# Patient Record
Sex: FEMALE | Race: WHITE | Employment: FULL TIME | URBAN - METROPOLITAN AREA
[De-identification: names, ages, dates, MRNs, and addresses within clinical notes are randomized per-mention and may not be internally consistent; named-entity substitution may affect disease eponyms.]

---

## 2017-01-03 ENCOUNTER — TRANSFERRED RECORDS (OUTPATIENT)
Dept: HEALTH INFORMATION MANAGEMENT | Facility: CLINIC | Age: 37
End: 2017-01-03

## 2017-01-06 ENCOUNTER — TRANSFERRED RECORDS (OUTPATIENT)
Dept: HEALTH INFORMATION MANAGEMENT | Facility: CLINIC | Age: 37
End: 2017-01-06

## 2017-01-08 ENCOUNTER — TRANSFERRED RECORDS (OUTPATIENT)
Dept: HEALTH INFORMATION MANAGEMENT | Facility: CLINIC | Age: 37
End: 2017-01-08

## 2017-01-25 ENCOUNTER — PRENATAL OFFICE VISIT (OUTPATIENT)
Dept: OBGYN | Facility: CLINIC | Age: 37
End: 2017-01-25
Payer: COMMERCIAL

## 2017-01-25 VITALS
HEART RATE: 96 BPM | BODY MASS INDEX: 31.45 KG/M2 | SYSTOLIC BLOOD PRESSURE: 141 MMHG | TEMPERATURE: 98.4 F | WEIGHT: 166.6 LBS | HEIGHT: 61 IN | DIASTOLIC BLOOD PRESSURE: 96 MMHG

## 2017-01-25 DIAGNOSIS — Z30.011 ENCOUNTER FOR INITIAL PRESCRIPTION OF CONTRACEPTIVE PILLS: ICD-10-CM

## 2017-01-25 PROCEDURE — 99207 ZZC POST PARTUM EXAM: CPT | Performed by: NURSE PRACTITIONER

## 2017-01-25 RX ORDER — NORGESTIMATE AND ETHINYL ESTRADIOL 0.25-0.035
1 KIT ORAL DAILY
Qty: 84 TABLET | Refills: 3 | Status: SHIPPED | OUTPATIENT
Start: 2017-01-25 | End: 2017-07-10

## 2017-01-25 NOTE — NURSING NOTE
"Chief Complaint   Patient presents with     Post Partum Exam       Initial /96 mmHg  Pulse 96  Temp(Src) 98.4  F (36.9  C) (Oral)  Ht 5' 1.25\" (1.556 m)  Wt 166 lb 9.6 oz (75.569 kg)  BMI 31.21 kg/m2  LMP 06/24/2016  Breastfeeding? No Estimated body mass index is 31.21 kg/(m^2) as calculated from the following:    Height as of this encounter: 5' 1.25\" (1.556 m).    Weight as of this encounter: 166 lb 9.6 oz (75.569 kg)..  BP completed using cuff size: fransisco Husain CMA        "

## 2017-01-25 NOTE — MR AVS SNAPSHOT
"              After Visit Summary   2017    Ledy Oconnor    MRN: [id number]           Patient Information     Date Of Birth          1980        Visit Information        Provider Department      2017 11:10 AM Ruby Valdez APRN CNP [facility]        Today's Diagnoses     Routine postpartum follow-up    -  1     Postpartum depression         Encounter for initial prescription of contraceptive pills            Follow-ups after your visit        Who to contact     If you have questions or need follow up information about today's clinic visit or your schedule please contact [facility] directly at [phone].  Normal or non-critical lab and imaging results will be communicated to you by [employer], letter or phone within 4 business days after the clinic has received the results. If you do not hear from us within 7 days, please contact the clinic through [employer] or phone. If you have a critical or abnormal lab result, we will notify you by phone as soon as possible.  Submit refill requests through [employer] or call your pharmacy and they will forward the refill request to us. Please allow 3 business days for your refill to be completed.          Additional Information About Your Visit        MyChart Information     [employer] lets you send messages to your doctor, view your test results, renew your prescriptions, schedule appointments and more. To sign up, go to [email or web address] . Click on \"Log in\" on the left side of the screen, which will take you to the Welcome page. Then click on \"Sign up Now\" on the right side of the page.     You will be asked to enter the access code listed below, as well as some personal information. Please follow the directions to create your username and password.     Your access code is: [email or web address]  Expires: 3/9/2017  2:18 PM     Your access code will  in 90 days. If you need help or a new code, please call your [facility] or " "[phone].        Care EveryWhere ID     This is your Care EveryWhere ID. This could be used by other organizations to access your [place] medical records  [id number]        Your Vitals Were     Pulse Temperature Height BMI (Body Mass Index) Last Period Breastfeeding?    96 98.4  F (36.9  C) (Oral) 5' 1.25\" (1.556 m) 31.21 kg/m2 06/24/2016 No       Blood Pressure from Last 3 Encounters:   01/25/17 141/96   12/29/16 130/79   12/09/16 112/69    Weight from Last 3 Encounters:   01/25/17 166 lb 9.6 oz (75.569 kg)   12/29/16 179 lb (81.194 kg)   12/09/16 179 lb 3.2 oz (81.285 kg)              Today, you had the following     No orders found for display         Today's Medication Changes          These changes are accurate as of: 1/25/17 11:59 AM.  If you have any questions, ask your nurse or doctor.               Start taking these medicines.        Dose/Directions    norgestimate-ethinyl estradiol 0.25-35 MG-MCG per tablet   Commonly known as:  ORTHO-CYCLEN, SPRINTEC   Used for:  Encounter for initial prescription of contraceptive pills   Started by:  Ruby Valdez APRN CNP        Dose:  1 tablet   Take 1 tablet by mouth daily   Quantity:  84 tablet   Refills:  3       sertraline 50 MG tablet   Commonly known as:  ZOLOFT   Used for:  Postpartum depression   Started by:  Ruby Valdez APRN CNP        Take 1/2 tablet (25 mg) for 1-2 weeks, then increase to 1 tablet orally daily   Quantity:  30 tablet   Refills:  1         Stop taking these medicines if you haven't already. Please contact your care team if you have questions.     ferrous sulfate 325 (65 FE) MG tablet   Commonly known as:  IRON   Stopped by:  Ruby Valdez APRN CNP           PRENATAL VITAMIN PO   Stopped by:  Ruby Valdez APRN CNP                Where to get your medicines      These medications were sent to [id number] - [facility], [address] AT [facility] [address] [place] [address] [facility] [address], " [place] [address]     Phone:  [phone]    - norgestimate-ethinyl estradiol 0.25-35 MG-MCG per tablet  - sertraline 50 MG tablet             Primary Care Provider Office Phone # Fax #    Tyron Anderson [phone]       [facility] [address]  [facility] [address]        Thank you!     Thank you for choosing [facility]  [facility] your [facility]. Our goal is always to provide you with excellent care. Hearing back from our patients is one way we can continue to improve our services. Please take a few minutes to complete the written survey that you may receive in the mail after your visit with us. Thank you!             Your Updated Medication List - Protect others around you: Learn how to safely use, store and throw away your medicines at www.disposemymeds.org.          This list is accurate as of: 1/25/17 11:59 AM.  Always use your most recent med list.                   Brand Name Dispense Instructions for use    norgestimate-ethinyl estradiol 0.25-35 MG-MCG per tablet    ORTHO-CYCLEN, SPRINTEC    84 tablet    Take 1 tablet by mouth daily       sertraline 50 MG tablet    ZOLOFT    30 tablet    Take 1/2 tablet (25 mg) for 1-2 weeks, then increase to 1 tablet orally daily

## 2017-01-25 NOTE — PROGRESS NOTES
Ledy is here for a postpartum checkup.  Pregnancy was: Complicated by a marginal placental abruption. Patient was hospitalized after a bleeding episode and while in the hospital, had a large bleed and went into  labor. Delivered at 28 weeks. Daughter is in the NICU and will likely be there the next 2 months.     She is struggling with how to feel about everything. Feels she has great days and very poor days. Emotions and moods are all over. 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  She is not sure if she is needing medication or not at this time. Feels her symptoms are not consistent and can very from day to day.     Obstetric History       T0      TAB0   SAB2   E0   M0   L1       # Outcome Date GA Lbr Lonny/2nd Weight Sex Delivery Anes PTL Lv   3  17 28w2d  2 lb 6.8 oz (1.1 kg) F    Y   2 2009           1 2007                 Since delivery, she has not been breast feeding.  She has not had a normal menses.  She has not had intercourse.  Patient screened for postpartum depression and complaints are positive feelings of depression, loss of interest in activities, poor sleep, concentration, poor appetite. Patient decided not to return to her work and has since quit. Some days she knows she needs to take a break for herself, but then feels guilty if she does not go visit her daughter.  Screening has also been completed for intimate partner violence. She would like to discuss possible treatment of her depression and contraception.    O: This is a well appearing female in no acute distress. Answers questions and maintains eye contact appropriately. Vital signs noted.  RESPIRATORY: Clear to auscultation bilaterally.  CV: Regular rate and rhythm without murmur, gallop, rub  ABDOMEN: Soft, nontender, nondistended, normoactive bowel sounds. No hepatosplenomegaly. No guarding, rebounding, or  rigidity.  Vulva: No external lesions, normal hair distribution, no adenopathy  BUS:  Normal, no masses noted  Vagina: Moist, pink, no abnormal discharge, well rugated, no lesions  Cervix: Pink, parous, midline. Without cervical motion tenderness.  Uterus: Normal size and shape, non-tender, mobile  Ovaries: No masses, non-tender, mobile    A/P  Routine Postpartum     - I discussed the new pap recommendations regarding screening.  Explained the rationale for increased intervals between paps.  Questions asked and answered.  She does agree to this regiment.   - Pap was not performed   - Contraception: They are certain they do not want any future pregnancy.  will be doing vasectomy, wants something short term until then. Has used Depo Provera for a year 20 years ago and no other contraception. Discussed options for contraception with patient and she would like to try an oral contraceptive pill. She is not 3 weeks out from delivery at this time, so advised when to start the pill and rationale, taking it at the same time every day, possible side effects she may experience.    - Depression. We had a lengthy discussion regarding her concerns, fears, feelings. PHQ-9 reviewed. Patient is reassured that a lot of what she is feeling is normal, especially given her pregnancy and delivery. A lot of support is given. We discussed her situation at length and ways to help focus on other things as well as her daughter. She is really hesitant to go on medication, but interested in trying it. We discussed management and for now, she requests a prescription to have if she chooses to start. We discussed taking it, increasing dose, possible side effects, how long before she may feel benefits. She is encouraged to continue the support groups and finding outlets to allow herself to relax. If she chooses to start medication, will let me know so we can follow up accordingly. Patient encouraged to call or return to clinic if she needs  anything.    Ruby MARVIN CNP

## 2017-01-26 ASSESSMENT — PATIENT HEALTH QUESTIONNAIRE - PHQ9: SUM OF ALL RESPONSES TO PHQ QUESTIONS 1-9: 12

## 2017-03-21 ENCOUNTER — OFFICE VISIT (OUTPATIENT)
Dept: FAMILY MEDICINE | Facility: CLINIC | Age: 37
End: 2017-03-21
Payer: COMMERCIAL

## 2017-03-21 VITALS
HEART RATE: 84 BPM | SYSTOLIC BLOOD PRESSURE: 134 MMHG | DIASTOLIC BLOOD PRESSURE: 88 MMHG | TEMPERATURE: 98 F | WEIGHT: 170 LBS | OXYGEN SATURATION: 99 % | BODY MASS INDEX: 31.86 KG/M2

## 2017-03-21 DIAGNOSIS — L70.0 CYSTIC ACNE: ICD-10-CM

## 2017-03-21 DIAGNOSIS — D22.39 MELANOCYTIC NEVUS OF FACE, OTHER LOCATION: Primary | ICD-10-CM

## 2017-03-21 DIAGNOSIS — Z23 NEED FOR VACCINATION: ICD-10-CM

## 2017-03-21 PROCEDURE — 99213 OFFICE O/P EST LOW 20 MIN: CPT | Mod: 25 | Performed by: PHYSICIAN ASSISTANT

## 2017-03-21 PROCEDURE — 90715 TDAP VACCINE 7 YRS/> IM: CPT | Performed by: PHYSICIAN ASSISTANT

## 2017-03-21 PROCEDURE — 90471 IMMUNIZATION ADMIN: CPT | Performed by: PHYSICIAN ASSISTANT

## 2017-03-21 NOTE — MR AVS SNAPSHOT
After Visit Summary   3/21/2017    Ledy Oconnor    MRN: [id number]           Patient Information     Date Of Birth          1980        Visit Information        Provider Department      3/21/2017 9:50 AM Leonora Garcia PA-C [facility]        Today's Diagnoses     Melanocytic nevus of face, other location    -  1    Cystic acne          Care Instructions    Follow up with dermatology          Follow-ups after your visit        Additional Services     DERMATOLOGY REFERRAL       Your provider has referred you to: FMG: [facility] [place] [phone]   [email or web address]  UMP: [facility] [place] [phone]   [email or web address]  [facility] [place] [phone]   [email or web address]  [place] (2 locations) [phone]   [email or web address]  [place] [phone]   [email or web address]    Please be aware that coverage of these services is subject to the terms and limitations of your health insurance plan.  Call member services at your health plan with any benefit or coverage questions.      Please bring the following with you to your appointment:    (1) Any X-Rays, CTs or MRIs which have been performed.  Contact the facility where they were done to arrange for  prior to your scheduled appointment.    (2) List of current medications  (3) This referral request   (4) Any documents/labs given to you for this referral                  Who to contact     If you have questions or need follow up information about today's clinic visit or your schedule please contact [facility] directly at [phone].  Normal or non-critical lab and imaging results will be communicated to you by MyChart, letter or phone within 4 business days after the clinic has received the  "results. If you do not hear from us within 7 days, please contact the clinic through [employer] or phone. If you have a critical or abnormal lab result, we will notify you by phone as soon as possible.  Submit refill requests through [employer] or call your pharmacy and they will forward the refill request to us. Please allow 3 business days for your refill to be completed.          Additional Information About Your Visit        [employer] Information     [employer] lets you send messages to your doctor, view your test results, renew your prescriptions, schedule appointments and more. To sign up, go to [email or web address] . Click on \"Log in\" on the left side of the screen, which will take you to the Welcome page. Then click on \"Sign up Now\" on the right side of the page.     You will be asked to enter the access code listed below, as well as some personal information. Please follow the directions to create your username and password.     Your access code is: [email or web address]  Expires: 2017 10:26 AM     Your access code will  in 90 days. If you need help or a new code, please call your [place] clinic or [phone].        Care EveryWhere ID     This is your Care EveryWhere ID. This could be used by other organizations to access your [place] medical records  [id number]        Your Vitals Were     Pulse Temperature Pulse Oximetry BMI (Body Mass Index)          84 98  F (36.7  C) (Oral) 99% 31.86 kg/m2         Blood Pressure from Last 3 Encounters:   17 134/88   17 (!) 141/96   16 130/79    Weight from Last 3 Encounters:   17 170 lb (77.1 kg)   17 166 lb 9.6 oz (75.6 kg)   16 179 lb (81.2 kg)              We Performed the Following     DERMATOLOGY REFERRAL          Today's Medication Changes          These changes are accurate as of: 3/21/17 10:26 AM.  If you have any questions, ask your nurse or doctor.               Stop taking these medicines if you haven't already. Please " contact your care team if you have questions.     sertraline 50 MG tablet   Commonly known as:  ZOLOFT   Stopped by:  Leonora Garcia PA-C                    Primary Care Provider Office Phone # Fax #    Tyron Anderson [phone]       [facility] [address]  [facility] [address]        Thank you!     Thank you for choosing [facility]  [facility]. Our goal is always to provide you with excellent care. Hearing back from our patients is one way we can continue to improve our services. Please take a few minutes to complete the written survey that you may receive in the mail after your visit with us. Thank you!             Your Updated Medication List - Protect others around you: Learn how to safely use, store and throw away your medicines at www.disposemymeds.org.          This list is accurate as of: 3/21/17 10:26 AM.  Always use your most recent med list.                   Brand Name Dispense Instructions for use    norgestimate-ethinyl estradiol 0.25-35 MG-MCG per tablet    ORTHO-CYCLEN, SPRINTEC    84 tablet    Take 1 tablet by mouth daily

## 2017-03-21 NOTE — PROGRESS NOTES
SUBJECTIVE:                                                    Ledy Oconnor is a 36 year old female who presents to clinic today for the following health issues:    Mole of left side of cheek - present all her life. Cyst growing under her mole for past year. It is starting to become tender. She also has cystic acne, which worsened with her pregnancy. She denies any erythema, drainage, swelling, fevers, and chills. She has tried mutliple over the counter treatments with no improvement. She denies any other concerns.     Needs tdap - she will get this today.        Problem list and histories reviewed & adjusted, as indicated.  Additional history: as documented    Patient Active Problem List   Diagnosis     Cluster headache, chronic     Fibroid uterus     Past Surgical History   Procedure Laterality Date     No history of surgery         Social History   Substance Use Topics     Smoking status: Former Smoker     Packs/day: 0.00     Types: Cigarettes     Smokeless tobacco: Never Used     Alcohol use No     Family History   Problem Relation Age of Onset     Migraines Mother      unknown age     Migraines Brother 30         Current Outpatient Prescriptions   Medication Sig Dispense Refill     norgestimate-ethinyl estradiol (ORTHO-CYCLEN, SPRINTEC) 0.25-35 MG-MCG per tablet Take 1 tablet by mouth daily (Patient not taking: Reported on 3/21/2017) 84 tablet 3     No Known Allergies  BP Readings from Last 3 Encounters:   03/21/17 (!) 153/103   01/25/17 (!) 141/96   12/29/16 130/79    Wt Readings from Last 3 Encounters:   03/21/17 170 lb (77.1 kg)   01/25/17 166 lb 9.6 oz (75.6 kg)   12/29/16 179 lb (81.2 kg)                    Reviewed and updated as needed this visit by clinical staff       Reviewed and updated as needed this visit by Provider         ROS:  Constitutional, and integumentary systems are negative, except as otherwise noted.    OBJECTIVE:                                                    /88  Pulse  84  Temp 98  F (36.7  C) (Oral)  Wt 170 lb (77.1 kg)  SpO2 99%  BMI 31.86 kg/m2  Body mass index is 31.86 kg/(m^2).  GENERAL: healthy, alert and no distress  SKIN: Left Cheek - small mole present with a firm cyst present underneath the mole, slightly mobile, tender, no erythema, no drainage, no surrounding swelling; cystic acne and scars present on the neck          ASSESSMENT/PLAN:                                                        ICD-10-CM    1. Melanocytic nevus of face, other location D22.39 DERMATOLOGY REFERRAL   2. Cystic acne L70.0 DERMATOLOGY REFERRAL   3. Need for vaccination Z23 TDAP (ADACEL AGES 11-64) [40414.002]     1st  Administration  [34542]       Patient Instructions   Follow up with dermatology        Leonora Garcia PA-C  [facility]

## 2017-03-21 NOTE — NURSING NOTE
"Chief Complaint   Patient presents with     Mole     left side of cheek - right thigh - referral to derm       Initial BP (!) 153/103  Pulse 84  Temp 98  F (36.7  C) (Oral)  Wt 170 lb (77.1 kg)  SpO2 99%  BMI 31.86 kg/m2 Estimated body mass index is 31.86 kg/(m^2) as calculated from the following:    Height as of 1/25/17: 5' 1.25\" (1.556 m).    Weight as of this encounter: 170 lb (77.1 kg).  Medication Reconciliation: complete  Kaitlynn Correia M.A.    "

## 2017-07-10 ENCOUNTER — OFFICE VISIT (OUTPATIENT)
Dept: FAMILY MEDICINE | Facility: CLINIC | Age: 37
End: 2017-07-10
Payer: COMMERCIAL

## 2017-07-10 VITALS
HEART RATE: 96 BPM | BODY MASS INDEX: 32.2 KG/M2 | SYSTOLIC BLOOD PRESSURE: 131 MMHG | TEMPERATURE: 99.3 F | DIASTOLIC BLOOD PRESSURE: 82 MMHG | WEIGHT: 175 LBS | OXYGEN SATURATION: 96 % | HEIGHT: 62 IN

## 2017-07-10 DIAGNOSIS — M65.4 DE QUERVAIN'S DISEASE (TENOSYNOVITIS): Primary | ICD-10-CM

## 2017-07-10 PROCEDURE — 99214 OFFICE O/P EST MOD 30 MIN: CPT | Performed by: PHYSICIAN ASSISTANT

## 2017-07-10 RX ORDER — IBUPROFEN 800 MG/1
800 TABLET, FILM COATED ORAL EVERY 8 HOURS PRN
Qty: 90 TABLET | Refills: 1 | Status: SHIPPED | OUTPATIENT
Start: 2017-07-10 | End: 2019-03-25

## 2017-07-10 NOTE — PROGRESS NOTES
SUBJECTIVE:                                                    Ledy Oconnor is a 36 year old female who presents to clinic today for the following health issues:      NEW PATIENT TO ME:      Joint Pain    Onset: x 1 month and a half    Description:   Location: left wrist  Character: Sharp, Dull ache, Stabbing and Burning    Intensity: moderate    Progression of Symptoms: same    Accompanying Signs & Symptoms:  Other symptoms: tingling     History:   Previous similar pain: no       Precipitating factors:   Trauma or overuse: no     Alleviating factors:  Improved by: rest/inactivity, heat, ice, immobilization, massage and support wrap that does not include the thumb.     Therapies Tried and outcome: Noted above    Left wrist and thumb pain x 1.5 months. Worsening. Worse with use.   Is left handed.  Has a young new infant that she lifts a lot, otherwise nothing new. No history of joint pains. Does text a lot.   Worked before in the [employer] field and used her hands and thumb but never had issues before.  No known injury.     Does have some weakness with her  and can drop things.  It really bothers her. No redness or swelling. No fevers.  Tingling goes away quickly but can occur in thumb.   ibuprofen helps some, she is wondering about an Rx for this.   Nonsmoker.     Problem list and histories reviewed & adjusted, as indicated.  Additional history: as documented    Patient Active Problem List   Diagnosis     Cluster headache, chronic     Fibroid uterus     Past Surgical History:   Procedure Laterality Date     NO HISTORY OF SURGERY         Social History   Substance Use Topics     Smoking status: Former Smoker     Packs/day: 0.00     Types: Cigarettes     Smokeless tobacco: Never Used     Alcohol use No     Family History   Problem Relation Age of Onset     Migraines Mother      unknown age     Migraines Brother 30         Current Outpatient Prescriptions   Medication Sig Dispense Refill     ibuprofen  "(ADVIL/MOTRIN) 800 MG tablet Take 1 tablet (800 mg) by mouth every 8 hours as needed for moderate pain With food. 90 tablet 1     No Known Allergies    ROS:  Constitutional, HEENT, cardiovascular, pulmonary, gi and gu systems are negative, except as otherwise noted.    OBJECTIVE:     /82  Pulse 96  Temp 99.3  F (37.4  C) (Oral)  Ht 5' 2\" (1.575 m)  Wt 175 lb (79.4 kg)  SpO2 96%  Breastfeeding? No  BMI 32.01 kg/m2  Body mass index is 32.01 kg/(m^2).  GENERAL: healthy, alert and no distress  RESP: lungs clear to auscultation - no rales, rhonchi or wheezes  CV: regular rate and rhythm, normal S1 S2, no S3 or S4, no murmur, click or rub, no peripheral edema and peripheral pulses strong  MS: left de quervans region-palpable crepitus and pain.  Pain with movement of thumb especially abduction/adduction. No erythema or edema. No rashes. Wrist range of motion intact. Finger range of motion intact. Cap refill normal. Distal sensation intact. Radial pulse normal. No elbow deformity.  Some mild tingling with tinels test of carpel tunnel also. POSITIVE FINKELSTEINS.   SKIN: no suspicious lesions or rashes  NEURO: Normal strength and tone, mentation intact and speech normal  PSYCH: mentation appears normal, affect normal/bright        ASSESSMENT/PLAN:   ASSESSMENT / PLAN:  (M65.4) De Quervain's disease (tenosynovitis)  (primary encounter diagnosis)  Comment:   Plan: ORTHOPEDICS ADULT REFERRAL, ibuprofen         (ADVIL/MOTRIN) 800 MG tablet          We discussed handout below and will start with conservative treatment  To ortho if not improving as expected  See patient instructions below for plan.  Correct brace given in clinic to restrict thumb movement    Billin min spent face-to-face with patient. 15 min on history, 5 on exam, 5 on discussing diagnosis and treatment plan.     Patient Instructions   Schedule with ortho only if worsening or not improving after 2-3 weeks  Limit use of thumb as much as " possible  Wear brace as much as possible  Ibuprofen as needed          De Quervain Tenosynovitis    De Quervain tenosynovitis is inflammation of tendons and synovium on the thumb side of the wrist. Tendons are fibers that attach muscle to bone. Synovium is a slick membrane that helps tendons move. Movements done over and over can irritate and inflame these tissues. This can cause pain when you touch or grasp objects, turn or twist your wrist, or make a fist. You may also have pain and swelling near the base of the thumb or numbness along the back of your thumb and index finger. You may also feel the thumb catch or snap when you move it.  Treatment will depend on how bad the pain is. It can often be treated with medicines, injections, splinting, and home care. If your case is severe, you may be referred to a specialist to talk about surgery.  Home care  Your healthcare provider may prescribe medicines to relieve pain and reduce inflammation. A steroid medicine may be injected near the tendons. This reduces swelling. The healthcare provider may also suggest taking over-the-counter medicines like ibuprofen or naproxen. These help reduce inflammation. Take all medicines only as directed.  The following are general care guidelines:    Avoid repetitive movements of your wrist and thumb.    Note any activity that causes pain or swelling. If possible, avoid or limit that activity.    Put a cold pack on your thumb. You can make your own cold pack by wrapping a plastic bag of ice or bag of frozen vegetables in a thin towel. Hold this to your thumb for up to 20 minutes at a time. Don't put ice directly on the skin.    Your healthcare provider may put a splint on the thumb to hold it still. Use the splint as you have been instructed. In some cases, you may need to use a splint 24 hours a day for 4 to 6 weeks. This will allow the wrist and thumb to heal.  Follow-up care  Follow up with your healthcare provider, or as advised.  You may need more treatment if your injury is severe or if your symptoms don't get better. This additional treatment may include local injections, physical therapy, and surgery.  When to seek medical advice  Call your healthcare provider right away if any of these occur:    Increase in pain or swelling    If you have fever, chills, redness, warmth, or drainage    Symptoms get worse after taking medicine    Pain spreads farther down the thumb or into the forearm    Pain continues to get in the way of daily life  Date Last Reviewed: 1/18/2016 2000-2017 [employer]. [address] [place] [id number]. All rights reserved. This information is not intended as a substitute for professional medical care. Always follow your healthcare professional's instructions.            Cathryn Mcgregor PA-C  [facility]

## 2017-07-10 NOTE — PATIENT INSTRUCTIONS
Schedule with ortho only if worsening or not improving after 2-3 weeks  Limit use of thumb as much as possible  Wear brace as much as possible  Ibuprofen as needed          De Quervain Tenosynovitis    De Quervain tenosynovitis is inflammation of tendons and synovium on the thumb side of the wrist. Tendons are fibers that attach muscle to bone. Synovium is a slick membrane that helps tendons move. Movements done over and over can irritate and inflame these tissues. This can cause pain when you touch or grasp objects, turn or twist your wrist, or make a fist. You may also have pain and swelling near the base of the thumb or numbness along the back of your thumb and index finger. You may also feel the thumb catch or snap when you move it.  Treatment will depend on how bad the pain is. It can often be treated with medicines, injections, splinting, and home care. If your case is severe, you may be referred to a specialist to talk about surgery.  Home care  Your healthcare provider may prescribe medicines to relieve pain and reduce inflammation. A steroid medicine may be injected near the tendons. This reduces swelling. The healthcare provider may also suggest taking over-the-counter medicines like ibuprofen or naproxen. These help reduce inflammation. Take all medicines only as directed.  The following are general care guidelines:    Avoid repetitive movements of your wrist and thumb.    Note any activity that causes pain or swelling. If possible, avoid or limit that activity.    Put a cold pack on your thumb. You can make your own cold pack by wrapping a plastic bag of ice or bag of frozen vegetables in a thin towel. Hold this to your thumb for up to 20 minutes at a time. Don't put ice directly on the skin.    Your healthcare provider may put a splint on the thumb to hold it still. Use the splint as you have been instructed. In some cases, you may need to use a splint 24 hours a day for 4 to 6 weeks. This will allow  the wrist and thumb to heal.  Follow-up care  Follow up with your healthcare provider, or as advised. You may need more treatment if your injury is severe or if your symptoms don't get better. This additional treatment may include local injections, physical therapy, and surgery.  When to seek medical advice  Call your healthcare provider right away if any of these occur:    Increase in pain or swelling    If you have fever, chills, redness, warmth, or drainage    Symptoms get worse after taking medicine    Pain spreads farther down the thumb or into the forearm    Pain continues to get in the way of daily life  Date Last Reviewed: 1/18/2016 2000-2017 [employer]. [address] [place] [address], [place] [address]. All rights reserved. This information is not intended as a substitute for professional medical care. Always follow your healthcare professional's instructions.

## 2017-07-10 NOTE — NURSING NOTE
"Chief Complaint   Patient presents with     Musculoskeletal Problem     wrist pain per pt x 1 month and a half no known injury        Initial /82  Pulse 96  Temp 99.3  F (37.4  C) (Oral)  Ht 5' 2\" (1.575 m)  Wt 175 lb (79.4 kg)  SpO2 96%  Breastfeeding? No  BMI 32.01 kg/m2 Estimated body mass index is 32.01 kg/(m^2) as calculated from the following:    Height as of this encounter: 5' 2\" (1.575 m).    Weight as of this encounter: 175 lb (79.4 kg).  Medication Reconciliation: complete      Ty Crawford MA    "

## 2017-07-10 NOTE — MR AVS SNAPSHOT
After Visit Summary   7/10/2017    Ledy Oconnor    MRN: [id number]           Patient Information     Date Of Birth          1980        Visit Information        Provider Department      7/10/2017 11:00 AM Cathryn Mcgregor PA-C [facility]        Today's Diagnoses     De Quervain's disease (tenosynovitis)    -  1      Care Instructions    Schedule with ortho only if worsening or not improving after 2-3 weeks  Limit use of thumb as much as possible  Wear brace as much as possible  Ibuprofen as needed          De Quervain Tenosynovitis    De Quervain tenosynovitis is inflammation of tendons and synovium on the thumb side of the wrist. Tendons are fibers that attach muscle to bone. Synovium is a slick membrane that helps tendons move. Movements done over and over can irritate and inflame these tissues. This can cause pain when you touch or grasp objects, turn or twist your wrist, or make a fist. You may also have pain and swelling near the base of the thumb or numbness along the back of your thumb and index finger. You may also feel the thumb catch or snap when you move it.  Treatment will depend on how bad the pain is. It can often be treated with medicines, injections, splinting, and home care. If your case is severe, you may be referred to a specialist to talk about surgery.  Home care  Your healthcare provider may prescribe medicines to relieve pain and reduce inflammation. A steroid medicine may be injected near the tendons. This reduces swelling. The healthcare provider may also suggest taking over-the-counter medicines like ibuprofen or naproxen. These help reduce inflammation. Take all medicines only as directed.  The following are general care guidelines:    Avoid repetitive movements of your wrist and thumb.    Note any activity that causes pain or swelling. If possible, avoid or limit that activity.    Put a cold pack on your thumb. You can make your own cold pack by  wrapping a plastic bag of ice or bag of frozen vegetables in a thin towel. Hold this to your thumb for up to 20 minutes at a time. Don't put ice directly on the skin.    Your healthcare provider may put a splint on the thumb to hold it still. Use the splint as you have been instructed. In some cases, you may need to use a splint 24 hours a day for 4 to 6 weeks. This will allow the wrist and thumb to heal.  Follow-up care  Follow up with your healthcare provider, or as advised. You may need more treatment if your injury is severe or if your symptoms don't get better. This additional treatment may include local injections, physical therapy, and surgery.  When to seek medical advice  Call your healthcare provider right away if any of these occur:    Increase in pain or swelling    If you have fever, chills, redness, warmth, or drainage    Symptoms get worse after taking medicine    Pain spreads farther down the thumb or into the forearm    Pain continues to get in the way of daily life  Date Last Reviewed: 1/18/2016 2000-2017 [employer]. [address] [place] [address]. All rights reserved. This information is not intended as a substitute for professional medical care. Always follow your healthcare professional's instructions.                Follow-ups after your visit        Additional Services     ORTHOPEDICS ADULT REFERRAL       Your provider has referred you to: [facility]: [facility] [phone]   [email or web address]    Please be aware that coverage of these services is subject to the terms and limitations of your health insurance plan.  Call member services at your health plan with any benefit or coverage questions.      Please bring the following to your appointment:    >>   Any x-rays, CTs or MRIs which have been performed.  Contact the facility where they were done to arrange for  prior to your scheduled appointment.    >>   List of  "current medications   >>   This referral request   >>   Any documents/labs given to you for this referral                  Who to contact     If you have questions or need follow up information about today's clinic visit or your schedule please contact [facility] directly at [phone].  Normal or non-critical lab and imaging results will be communicated to you by MyChart, letter or phone within 4 business days after the clinic has received the results. If you do not hear from us within 7 days, please contact the clinic through MyChart or phone. If you have a critical or abnormal lab result, we will notify you by phone as soon as possible.  Submit refill requests through [employer] or call your pharmacy and they will forward the refill request to us. Please allow 3 business days for your refill to be completed.          Additional Information About Your Visit        MyChart Information     [employer] lets you send messages to your doctor, view your test results, renew your prescriptions, schedule appointments and more. To sign up, go to [email or web address] . Click on \"Log in\" on the left side of the screen, which will take you to the Welcome page. Then click on \"Sign up Now\" on the right side of the page.     You will be asked to enter the access code listed below, as well as some personal information. Please follow the directions to create your username and password.     Your access code is: [email or web address]  Expires: 10/8/2017 11:23 AM     Your access code will  in 90 days. If you need help or a new code, please call your [facility] or [phone].        Care EveryWhere ID     This is your Care EveryWhere ID. This could be used by other organizations to access your [place] medical records  [id number]        Your Vitals Were     Pulse Temperature Height Pulse Oximetry Breastfeeding? BMI (Body Mass Index)    96 99.3  F (37.4  C) (Oral) 5' 2\" (1.575 m) 96% No 32.01 kg/m2       Blood Pressure " from Last 3 Encounters:   07/10/17 131/82   03/21/17 134/88   01/25/17 (!) 141/96    Weight from Last 3 Encounters:   07/10/17 175 lb (79.4 kg)   03/21/17 170 lb (77.1 kg)   01/25/17 166 lb 9.6 oz (75.6 kg)              We Performed the Following     ORTHOPEDICS ADULT REFERRAL          Today's Medication Changes          These changes are accurate as of: 7/10/17 11:25 AM.  If you have any questions, ask your nurse or doctor.               Start taking these medicines.        Dose/Directions    ibuprofen 800 MG tablet   Commonly known as:  ADVIL/MOTRIN   Used for:  De Quervain's disease (tenosynovitis)   Started by:  Cathryn Mcgregor PA-C        Dose:  800 mg   Take 1 tablet (800 mg) by mouth every 8 hours as needed for moderate pain With food.   Quantity:  90 tablet   Refills:  1            Where to get your medicines      These medications were sent to [facility] [address] [place] [address] AT [address] [address]     Phone:  [phone]     ibuprofen 800 MG tablet                Primary Care Provider Office Phone # Fax #    Tyron Anderson [phone]       [facility] [address] [address] [facility] [address]        Equal Access to Services     ASHISH BERRY AH: Hadii mac ku hadasho Soomaali, waaxda luqadaha, qaybta kaalmada adeegyada, adilson garcia. So [facility] [phone].    ATENCIÓN: Si habla español, tiene a mckee disposición servicios gratuitos de asistencia lingüística. Llame al [phone].    We comply with applicable federal civil rights laws and [place] laws. We do not discriminate on the basis of race, color, national origin, age, disability sex, sexual orientation or gender identity.            Thank you!     Thank you for choosing [facility]  for your [facility]. Our goal is always to provide you with excellent care. Hearing back from our patients is one way we  can continue to improve our services. Please take a few minutes to complete the written survey that you may receive in the mail after your visit with us. Thank you!             Your Updated Medication List - Protect others around you: Learn how to safely use, store and throw away your medicines at www.disposemymeds.org.          This list is accurate as of: 7/10/17 11:25 AM.  Always use your most recent med list.                   Brand Name Dispense Instructions for use Diagnosis    ibuprofen 800 MG tablet    ADVIL/MOTRIN    90 tablet    Take 1 tablet (800 mg) by mouth every 8 hours as needed for moderate pain With food.    De Quervain's disease (tenosynovitis)

## 2017-08-16 ENCOUNTER — OFFICE VISIT (OUTPATIENT)
Dept: FAMILY MEDICINE | Facility: CLINIC | Age: 37
End: 2017-08-16
Payer: COMMERCIAL

## 2017-08-16 VITALS
BODY MASS INDEX: 31.24 KG/M2 | WEIGHT: 170.8 LBS | HEART RATE: 97 BPM | SYSTOLIC BLOOD PRESSURE: 127 MMHG | DIASTOLIC BLOOD PRESSURE: 87 MMHG | TEMPERATURE: 98 F

## 2017-08-16 DIAGNOSIS — N83.299 COMPLEX OVARIAN CYST: ICD-10-CM

## 2017-08-16 DIAGNOSIS — R10.31 RLQ ABDOMINAL PAIN: Primary | ICD-10-CM

## 2017-08-16 PROCEDURE — 99214 OFFICE O/P EST MOD 30 MIN: CPT | Performed by: FAMILY MEDICINE

## 2017-08-16 NOTE — NURSING NOTE
"Chief Complaint   Patient presents with     Abdominal Pain     was seen in urgent care 8/9/17, having abdominal pressure, had baby in January at 28 weeks gestation        Initial /87  Pulse 97  Temp 98  F (36.7  C) (Oral)  Wt 170 lb 12.8 oz (77.5 kg)  LMP 07/25/2017  BMI 31.24 kg/m2 Estimated body mass index is 31.24 kg/(m^2) as calculated from the following:    Height as of 7/10/17: 5' 2\" (1.575 m).    Weight as of this encounter: 170 lb 12.8 oz (77.5 kg).  Medication Reconciliation: complete  Richy Sanchez CMA    "

## 2017-08-16 NOTE — PROGRESS NOTES
SUBJECTIVE:  37 year old.The patient has a history of right sided pelvic pain.  This started one week ago. Location right lower quadrant  quality sharp Associated symptoms are pressure like constipation.  She was seen in the [facility] urgent care.  They felt this was a pelvic cyst. ROS no constipation or diarrhea. Cycles are regular,  Period are heavy. The intense pain she has took 5 days before it improved which ibuprofen helped.  The pain now has a pressure on the right lower quadrant  Reviewed health maintenance  Patient Active Problem List   Diagnosis     Cluster headache, chronic     Fibroid uterus     Past Medical History:   Diagnosis Date     Headache        OBJECTIVE:  no apparent distress  /87  Pulse 97  Temp 98  F (36.7  C) (Oral)  Wt 170 lb 12.8 oz (77.5 kg)  LMP 07/25/2017  BMI 31.24 kg/m2    LUNGS:  CTA B/L, no wheezing or crackles.   Cardiovascular: negative, PMI normal. No lifts, heaves, or thrills. RRR. No murmurs, clicks gallops or rub   Gastrointestinal: Abdomen soft,-tender. Tender right lower quadrant with no rebound. BS normal. No masses, organomegaly       ICD-10-CM    1. RLQ abdominal pain R10.31    2. Complex ovarian cyst N83.299 US Pelvic Complete w Transvaginal    PLAN: Retun to clinic if symptoms worsen

## 2017-08-16 NOTE — MR AVS SNAPSHOT
"              After Visit Summary   8/16/2017    Ledy Oconnor    MRN: [id number]           Patient Information     Date Of Birth          1980        Visit Information        Provider Department      8/16/2017 4:00 PM Syed Brown MD [facility]        Today's Diagnoses     RLQ abdominal pain    -  1    Complex ovarian cyst           Follow-ups after your visit        Future tests that were ordered for you today     Open Future Orders        Priority Expected Expires Ordered    US Pelvic Complete w Transvaginal Routine  8/16/2018 8/16/2017            Who to contact     If you have questions or need follow up information about today's clinic visit or your schedule please contact [facility] directly at [phone].  Normal or non-critical lab and imaging results will be communicated to you by MyChart, letter or phone within 4 business days after the clinic has received the results. If you do not hear from us within 7 days, please contact the clinic through Crambuhart or phone. If you have a critical or abnormal lab result, we will notify you by phone as soon as possible.  Submit refill requests through ShareHows or call your pharmacy and they will forward the refill request to us. Please allow 3 business days for your refill to be completed.          Additional Information About Your Visit        MyChart Information     ShareHows lets you send messages to your doctor, view your test results, renew your prescriptions, schedule appointments and more. To sign up, go to [email or web address] . Click on \"Log in\" on the left side of the screen, which will take you to the Welcome page. Then click on \"Sign up Now\" on the right side of the page.     You will be asked to enter the access code listed below, as well as some personal information. Please follow the directions to create your username and password.     Your access code is: [email or web address]  Expires: 10/8/2017 11:23 AM     Your " access code will  in 90 days. If you need help or a new code, please call your [place] clinic or [phone].        Care EveryWhere ID     This is your Care EveryWhere ID. This could be used by other organizations to access your [facility] medical records  [id number]        Your Vitals Were     Pulse Temperature Last Period BMI (Body Mass Index)          97 98  F (36.7  C) (Oral) 2017 31.24 kg/m2         Blood Pressure from Last 3 Encounters:   17 127/87   07/10/17 131/82   17 134/88    Weight from Last 3 Encounters:   17 170 lb 12.8 oz (77.5 kg)   07/10/17 175 lb (79.4 kg)   17 170 lb (77.1 kg)               Primary Care Provider Office Phone # Fax #    Tyron Anderson [phone]       [address]  [facility] [address]        Equal Access to Services     [facility] : Hadii aad ku hadasho Soomaali, waaxda luqadaha, qaybta kaalmada adeegyada, waxay idiin hayaan elenaeg viridianaaraisidro walsh . So [facility] [phone].    ATENCIÓN: Si habla español, tiene a mckee disposición servicios gratuitos de asistencia lingüística. [facility] [phone].    We comply with applicable federal civil rights laws and [place] laws. We do not discriminate on the basis of race, color, national origin, age, disability sex, sexual orientation or gender identity.            Thank you!     Thank you for choosing [facility]  [facility]. Our goal is always to provide you with excellent care. Hearing back from our patients is one way we can continue to improve our services. Please take a few minutes to complete the written survey that you may receive in the mail after your visit with us. Thank you!             Your Updated Medication List - Protect others around you: Learn how to safely use, store and throw away your medicines at www.disposemymeds.org.          This list is accurate as of: 17  4:58 PM.  Always use your most recent med list.                   Brand  Name Dispense Instructions for use Diagnosis    ibuprofen 800 MG tablet    ADVIL/MOTRIN    90 tablet    Take 1 tablet (800 mg) by mouth every 8 hours as needed for moderate pain With food.    De Quervain's disease (tenosynovitis)

## 2017-09-21 ENCOUNTER — OFFICE VISIT (OUTPATIENT)
Dept: ORTHOPEDICS | Facility: CLINIC | Age: 37
End: 2017-09-21
Payer: COMMERCIAL

## 2017-09-21 ENCOUNTER — RADIANT APPOINTMENT (OUTPATIENT)
Dept: GENERAL RADIOLOGY | Facility: CLINIC | Age: 37
End: 2017-09-21
Attending: ORTHOPAEDIC SURGERY
Payer: COMMERCIAL

## 2017-09-21 VITALS — HEIGHT: 62 IN | WEIGHT: 169.6 LBS | RESPIRATION RATE: 16 BRPM | BODY MASS INDEX: 31.21 KG/M2

## 2017-09-21 DIAGNOSIS — M25.532 LEFT WRIST PAIN: ICD-10-CM

## 2017-09-21 DIAGNOSIS — M65.4 RADIAL STYLOID TENOSYNOVITIS: Primary | ICD-10-CM

## 2017-09-21 PROCEDURE — 99203 OFFICE O/P NEW LOW 30 MIN: CPT | Mod: 25 | Performed by: ORTHOPAEDIC SURGERY

## 2017-09-21 PROCEDURE — 73110 X-RAY EXAM OF WRIST: CPT | Mod: LT

## 2017-09-21 PROCEDURE — 20550 NJX 1 TENDON SHEATH/LIGAMENT: CPT | Mod: LT | Performed by: ORTHOPAEDIC SURGERY

## 2017-09-21 ASSESSMENT — PAIN SCALES - GENERAL: PAINLEVEL: MODERATE PAIN (4)

## 2017-09-21 NOTE — PROGRESS NOTES
The patient's left wrist DeQuervain's was prepped with betadine solution after verification of allergies. Area approximately 10 cm x 10 cm prepped in a sterile fashion. After injection, betadine removed with soap and water and band-aids applied.    0.5cc Lidocaine 1% NDC 4851-2302-16, LOT -[id number],  2019  0.5cc Kenalog 40 NDC 6983-5791-19, LOT [id number],   injected into patient's left wrist DeQuervain's by:   Kris Washington PA-C, CADRE (Ortho)  Supervising Physician: Shawn Grant M.D., M.S.  Dept. of Orthopaedic Surgery  [facility]

## 2017-09-21 NOTE — NURSING NOTE
"Chief Complaint   Patient presents with     Wrist Pain     Left wrist/thumb pain. Onset: 6-7/2017. Patient states any times she moves her thumb in certain ways it causes pain. She has tried wearing a thumb brace, doesn't help. Uses ibuprofen for pain. She has used as at times. Pain over lateral wrist, radial side. Has a constant ache.       Initial Resp 16  Ht 1.575 m (5' 2\")  Wt 76.9 kg (169 lb 9.6 oz)  BMI 31.02 kg/m2 Estimated body mass index is 31.02 kg/(m^2) as calculated from the following:    Height as of this encounter: 1.575 m (5' 2\").    Weight as of this encounter: 76.9 kg (169 lb 9.6 oz).  Medication Reconciliation: patel Anthony Certified Medical Assistant    "

## 2017-09-21 NOTE — PROGRESS NOTES
Chief Complaint:   Chief Complaint   Patient presents with     Wrist Pain     Left wrist/thumb pain. Onset: 6-7/2017. Patient states any times she moves her thumb in certain ways it causes pain. She has tried wearing a thumb brace, doesn't help. Uses ibuprofen for pain. She has used as at times. Pain over lateral wrist, radial side. Has a constant ache.        Ledy Oconnor is seen today in the [facility] for evaluation of left wrist/thumb pain at the request of Cathryn Mcgregor PA-C.     HPI: Ledy Oconnor is a 37 year old female, right -hand dominant, who presents for evaluation and management of a left wrist pain, no known injury. Pain has been present since 6-7/2017. Since that time, pain has not improved. She started to notice the pain about 1 month after having her daughter. She was seen and given a brace. Her pain is located over the lateral wrist, over the radial side.  Pain is moderate, rated a 4/10. At worst, her pain is a 10/10.  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 For treatment she tries the brace, ibuprofen and ice.         She reports having mild pain/discomfort around the wrist site. She denies associated numbness or tingling. She denies any other injuries to her upper extremity.   Symptoms: moderate pain, no swelling.  Location of symptoms: left wrist/thumb.  Pain severity: 4/10, up to a 10/10  Pain quality: aching and sharp  Frequency of symptoms: frequently  Aggravating factors: repetitive motion, lifting her daughter, work duties..  Relieving factors: at rest, with ibuprofen, wrist brace.    Previous treatment: wrist brace, ibuprofen, and ice.    Past medical history:  has a past medical history of Headache.   Patient Active Problem List    Diagnosis Date Noted     Fibroid uterus 11/07/2016     Priority: Medium     Cluster headache, chronic 07/28/2015     Priority: Medium       Past surgical history:  has a past surgical history that  "includes no history of surgery.     Medications:    Current Outpatient Prescriptions   Medication Sig Dispense Refill     ibuprofen (ADVIL/MOTRIN) 800 MG tablet Take 1 tablet (800 mg) by mouth every 8 hours as needed for moderate pain With food. 90 tablet 1        Allergies:   No Known Allergies     Family History: family history includes Migraines in her mother; Migraines (age of onset: 30) in her brother.     Social History: Cytodyn/[employer].  reports that she has quit smoking. Her smoking use included Cigarettes. She smoked 0.00 packs per day. She has never used smokeless tobacco. She reports that she does not drink alcohol or use illicit drugs.    Review of Systems:  ROS: 10 point ROS neg other than the symptoms noted above in the HPI and past medical history.    This document serves as a record of the services and decisions personally performed and made by Shawn Grant MD. It was created on his behalf by Kaya Delaney, a trained medical scribe. The creation of this document is based the provider's statements to the medical scribe.    Scribe Kaya Delaney 3:49 PM 9/21/2017    Physical Exam  GENERAL APPEARANCE: healthy, alert, no distress.   SKIN: no suspicious lesions or rashes  NEURO: Normal strength and tone, mentation intact and speech normal  PSYCH:  mentation appears normal and affect normal. Not anxious.  RESPIRATORY: No increased work of breathing.    Resp 16  Ht 1.575 m (5' 2\")  Wt 76.9 kg (169 lb 9.6 oz)  BMI 31.02 kg/m2     LEFT HAND / WRIST EXAM:    Skin intact. No abnormal skin discoloration, erythema or ecchymosis.  Oblique thenar scar (states from childhood injury).  Normal wear pattern, color and tone.  No observable or palpable masses of the fingers or palm or wrist.  No palpable triggering of fingers.   No observable or palpable cords or nodules of the fingers or palm.    There is no swelling in the wrist or at the base of the thumb.  There is moderate tenderness over the first extensors.  There " is no ecchymosis.  There is no erythema of the surrounding skin.  There is no maceration of the skin.  There is no deformity in the area.  Intact extensors. No extensor lag.    Special tests wrist: .   Finkelstein's test: positive.    1st carpometacarpal grind: negative     Intact sensation light touch median, radial, ulnar nerves of the hand  Intact sensation to the radial and ulnar digital nerves of the fingers, as well as the finger tips.  Intact epl fpl fdp edc wrist flexion/extension biceps/triceps deltoid  Brisk capillary refill to all fingers.   Palpable radial pulse, 2+.    X-rays:  3 views left wrist from 9/21/2017 were reviewed in clinic today. On my review, no obvious fractures or deformities.    Assessment: 37 year old female with left wrist/thumb pain, deQuervain's tenosynovitis.    Plan:  Nonoperative treatment. Expect improvement in most cases 6-8 weeks.  * Rest. Immobilization in removable thumb spica splints.   * will refer to [facility] for thumb based handsplints, as well as treatment modalities as indicated.  * Activity modification - avoid activities that aggravate symptoms.  * NSAIDS - regular use for inflammation, with food, as long as no contra-indications. Please discuss with pcp and pediatrician if needed, especially if breastfeeding  * Ice twice daily to three times daily.  * Tylenol as needed for pain  * Injections: discussed, patient elects to proceed. See procedure note below.  * Return to clinic as needed.    PROCEDURE NOTE:  The risks, perceived benefits and potential complications (including but not limited to: bleeding, infection, pain, scar, damage to adjacent structures, atrophy or necrosis of soft tissue, skin blanching, failure to relieve symptoms, worsening of symptoms, allergic reaction) of injection were discussed with the patient. Questions were addressed and answered.The patient elected to proceed. Written informed consent was obtained. The correct  procedural site was identified and confirmed. A Left Wrist dequervain-1st dorsal compartment injection was performed using .5 cc  Kenalog-40 40mg per mL and .5 cc of local anesthetic after sterile prep, to the correct procedural site. Sterile bandaid applied. This was tolerated well by the patient. No apparent complications. Did also discuss that if diabetic, recommend close monitoring of blood sugars over the next week as cortisone injections can temporarily elevate blood sugars.       The information in this document, created by a scribe for me, accurately reflects the services I personally performed and the decisions made by me. I have reviewed and approved this document for accuracy.     Shawn Grant M.D., M.S.  Dept. of Orthopaedic Surgery  [facility]

## 2017-09-21 NOTE — PATIENT INSTRUCTIONS
Please remember to call and schedule a follow up appointment if one was recommended at your earliest convenience.  Orthopedics CLINIC HOURS TELEPHONE NUMBER   Dr. Rudy Fong  Certified Medical Assistant   Monday & Wednesday   8am - 5pm  Thursday 1pm - 5pm  Friday 8am -11:30am Specialty schedulers:   [phone] to schedule your surgery.  Main Clinic:   [phone] to make an appointment with any provider.    Urgent Care locations:    [facility] Monday-Friday Closed  Saturday-Sunday 9am-5pm      Monday-Friday 12pm - 8pm  Saturday-Sunday 9am-5pm [phone]     If SURGERY has been recommended, please call our Specialty Schedulers at [phone] to schedule your procedure.    If you need a medication refill, please contact your pharmacy. Please allow 3 business days for your refill to be completed.    If an MRI or CT scan has been recommended, please call [facility] Schedulers at [phone] to schedule your appointment.  Use [employer] (secure e-mail communication and access to your chart) to send a message or to make an appointment. Please ask how you can sign up for [employer].  Your care team's suggested websites for health information:   Www.[email or web address].org : Up to date and easily searchable information on multiple topics.   Www.health.[facility].[place].[email or web address] : [place] dept of heat, public health issues in [place], N1N1

## 2017-09-21 NOTE — MR AVS SNAPSHOT
After Visit Summary   9/21/2017    Ledy Oconnor    MRN: [id number]           Patient Information     Date Of Birth          1980        Visit Information        Provider Department      9/21/2017 3:15 PM Shawn Grant MD [facility] [place]        Today's Diagnoses     Left wrist pain    -  1      Care Instructions    Please remember to call and schedule a follow up appointment if one was recommended at your earliest convenience.  Orthopedics CLINIC HOURS TELEPHONE NUMBER   Dr. Rudy Fong  Certified Medical Assistant   Monday & Wednesday   8am - 5pm  Thursday 1pm - 5pm  Friday 8am -11:30am Specialty schedulers:   [phone] to schedule your surgery.  Main Clinic:   [phone] to make an appointment with any provider.    Urgent Care locations:    [facility] Monday-Friday Closed  Saturday-Sunday 9am-5pm      Monday-Friday 12pm - 8pm  Saturday-Sunday 9am-5pm [phone]     If SURGERY has been recommended, please call our Specialty Schedulers at [phone] to schedule your procedure.    If you need a medication refill, please contact your pharmacy. Please allow 3 business days for your refill to be completed.    If an MRI or CT scan has been recommended, please call [facility] Schedulers at [phone] to schedule your appointment.  Use [employer] (secure e-mail communication and access to your chart) to send a message or to make an appointment. Please ask how you can sign up for Abazab.  Your care team's suggested websites for health information:   [email or web address] : Up to date and easily searchable information on multiple topics.   Www.health.[facility].[place].us : [place] dept of heat[place], public health issues in [place], N1N1              Follow-ups after your visit        Your next 10 appointments already scheduled     Oct 06, 2017  3:00 PM CDT   US PELVIC COMPLETE W TRANSVAGINAL with BEUS1   [place]  [facility] [place] ([facility]    [address]  [place] [address] [phone]           Please bring a list of your medicines (including vitamins, minerals and over-the-counter drugs). Also, tell your doctor about any allergies you may have. Wear comfortable clothes and leave your valuables at home.  Adults: Drink six 8-ounce glasses of fluid one hour before your exam. Do NOT empty your bladder.  If you need to empty your bladder before your exam, try to release only a little bit of urine. Then, drink another 8oz glass of fluid.  Children: Children who are potty trained should drink at least 4 cups (32 oz) of liquid 45 minutes to one hour prior to the exam. The child s bladder must be full in order to achieve a diagnostic exam. If your child is very uncomfortable or has an urgent need to pee, please notify a technologist; they will try to find out how much longer the wait may be and provide instructions to help relieve the pressure. Occasionally it is medically necessary to insert a urinary catheter to fill the bladder.  Please call the Imaging Department at your exam site with any questions.              Who to contact     If you have questions or need follow up information about today's clinic visit or your schedule please contact [facility] directly at [phone].  Normal or non-critical lab and imaging results will be communicated to you by MyChart, letter or phone within 4 business days after the clinic has received the results. If you do not hear from us within 7 days, please contact the clinic through StoryPresshart or phone. If you have a critical or abnormal lab result, we will notify you by phone as soon as possible.  Submit refill requests through Wave - Private Location App or call your pharmacy and they will forward the refill request to us. Please allow 3 business days for your refill to be completed.          Additional Information About Your Visit        Wave - Private Location App  "Information     [employer] lets you send messages to your doctor, view your test results, renew your prescriptions, schedule appointments and more. To sign up, go to [email or web address] . Click on \"Log in\" on the left side of the screen, which will take you to the Welcome page. Then click on \"Sign up Now\" on the right side of the page.     You will be asked to enter the access code listed below, as well as some personal information. Please follow the directions to create your username and password.     Your access code is: [email or web address]  Expires: 10/8/2017 11:23 AM     Your access code will  in 90 days. If you need help or a new code, please call your [facility] clinic or [phone].        Care EveryWhere ID     This is your Care EveryWhere ID. This could be used by other organizations to access your [facility] medical records  [id number]        Your Vitals Were     Respirations Height BMI (Body Mass Index)             16 1.575 m (5' 2\") 31.02 kg/m2          Blood Pressure from Last 3 Encounters:   17 127/87   07/10/17 131/82   17 134/88    Weight from Last 3 Encounters:   17 76.9 kg (169 lb 9.6 oz)   17 77.5 kg (170 lb 12.8 oz)   07/10/17 79.4 kg (175 lb)               Primary Care Provider Office Phone # Fax #    Tyron Anderson [phone]       [address]  [place] [facility] [address]        Equal Access to Services     [facility]: Hadii aad ku hadasho Soomaali, waaxda luqadaha, qaybta kaalmada salma, adilson garcia. So [facility] [place] [phone].    ATENCIÓN: Si habla español, tiene a mckee disposición servicios gratuitos de asistencia lingüística. [facility] al [phone].    We comply with applicable federal civil rights laws and [place] laws. We do not discriminate on the basis of race, color, national origin, age, disability sex, sexual orientation or gender identity.            Thank you!     Thank you for choosing [facility]  for your care. Our goal is always to provide you with excellent care. Hearing back from our patients is one way we can continue to improve our services. Please take a few minutes to complete the written survey that you may receive in the mail after your visit with us. Thank you!             Your Updated Medication List - Protect others around you: Learn how to safely use, store and throw away your medicines at www.disposemymeds.org.          This list is accurate as of: 9/21/17  3:51 PM.  Always use your most recent med list.                   Brand Name Dispense Instructions for use Diagnosis    ibuprofen 800 MG tablet    ADVIL/MOTRIN    90 tablet    Take 1 tablet (800 mg) by mouth every 8 hours as needed for moderate pain With food.    De Quervain's disease (tenosynovitis)

## 2017-09-22 RX ORDER — TRIAMCINOLONE ACETONIDE 40 MG/ML
40 INJECTION, SUSPENSION INTRA-ARTICULAR; INTRAMUSCULAR ONCE
Qty: 1 ML | Refills: 0 | OUTPATIENT
Start: 2017-09-22 | End: 2017-09-22

## 2017-10-06 ENCOUNTER — RADIANT APPOINTMENT (OUTPATIENT)
Dept: ULTRASOUND IMAGING | Facility: CLINIC | Age: 37
End: 2017-10-06
Attending: FAMILY MEDICINE
Payer: COMMERCIAL

## 2017-10-06 DIAGNOSIS — N83.299 COMPLEX OVARIAN CYST: ICD-10-CM

## 2017-10-06 PROCEDURE — 76856 US EXAM PELVIC COMPLETE: CPT

## 2017-10-06 PROCEDURE — 76830 TRANSVAGINAL US NON-OB: CPT

## 2017-10-09 ENCOUNTER — TELEPHONE (OUTPATIENT)
Dept: FAMILY MEDICINE | Facility: CLINIC | Age: 37
End: 2017-10-09

## 2017-10-09 NOTE — TELEPHONE ENCOUNTER
Syed Brown MD [facility]                   Follow up in two to three months as recommended              US Pelvic Complete w Transvaginal   Status:  Final result   Visible to patient:  No (Not Released) Dx:  Complex ovarian cyst Order: [id number]       Notes Recorded by Capri Juan RN on 10/9/2017 at 10:33 AM  Left message on answering machine for patient to call back. Zuleyma HERNANDEZ [phone]    ------    Notes Recorded by Syed Brown MD on 10/6/2017 at 4:21 PM  Follow up in two to three months as recommended       Details        Reading Physician Reading Date Result Priority       Que Benitez MD 10/6/2017            Narrative             US PELVIC COMPLETE WITH TRANSVAGINAL 10/6/2017 3:39 PM     HISTORY: Other ovarian cyst, unspecified side    FINDINGS: Transvaginal images were performed to better evaluate the  patient's uterus, ovaries and endometrial stripe.    The uterus is normal in size measuring 9.4 x 4.5 x 5.9 cm. Two fundal  fibroids are present which appear subserosal in location. One in the  right fundus measures 3.5 x 2.3 x 2.4 cm. Another in the left fundus  measures 3.6 x 2.5 x 3.2 cm. Endometrial stripe measures 10 mm and is  normal for patient's age. The right ovary measures 4.3 x 3.3 x 4.2 cm  and contains a dominant follicle or cyst measuring 3.5 cm. The left  ovary is normal measuring 3.3 x 2.0 x 2.0 cm. Dominant follicle  measures 1.6 cm. The ovaries demonstrate normal color Doppler flow  bilaterally. No adnexal masses are present. No free pelvic fluid is  present.             Impression             IMPRESSION: Fundal fibroids are subserosal in location measuring 3.5  and 3.6 cm in size. Dominant follicle or cyst right ovary measures 3.5  cm. Ovaries are otherwise unremarkable. Follow-up ultrasound in 2 or 3  months as needed to assess for resolution.    QUE BENITEZ MD

## 2017-10-11 NOTE — TELEPHONE ENCOUNTER
Patient had returned call yesterday pm per voicemail.  Left message on answering machine for patient to call back. Zuleyma Juan RN

## 2017-10-13 NOTE — TELEPHONE ENCOUNTER
Patient returned call, left message on team RN's voice mail to call back after 3pm, best time available.  .Buffy GONZALEZN, RN, CPN

## 2017-10-13 NOTE — TELEPHONE ENCOUNTER
Pt notified via phone of results and plan per 10/9/17 note below.  Pt would like to come to clinic to discuss the results in greater detail and decide if further intervention is needed. Pt was assisted with scheduling a f/u appointment with Dr. Brown in 2 months (12/4/17) to review results and discuss any change in sx at that time, and pt states she will schedule a repeat US at that time per Radiology recommendations if needed at that time.  Erin Ro RN

## 2017-10-16 ENCOUNTER — RADIANT APPOINTMENT (OUTPATIENT)
Dept: GENERAL RADIOLOGY | Facility: CLINIC | Age: 37
End: 2017-10-16
Attending: NURSE PRACTITIONER
Payer: OTHER MISCELLANEOUS

## 2017-10-16 ENCOUNTER — OFFICE VISIT (OUTPATIENT)
Dept: URGENT CARE | Facility: URGENT CARE | Age: 37
End: 2017-10-16
Payer: OTHER MISCELLANEOUS

## 2017-10-16 VITALS
BODY MASS INDEX: 31.1 KG/M2 | WEIGHT: 169 LBS | DIASTOLIC BLOOD PRESSURE: 78 MMHG | HEART RATE: 82 BPM | SYSTOLIC BLOOD PRESSURE: 122 MMHG | HEIGHT: 62 IN | TEMPERATURE: 97.9 F | OXYGEN SATURATION: 98 %

## 2017-10-16 DIAGNOSIS — M79.642 PAIN OF LEFT HAND: ICD-10-CM

## 2017-10-16 DIAGNOSIS — M79.642 PAIN OF LEFT HAND: Primary | ICD-10-CM

## 2017-10-16 PROCEDURE — 73130 X-RAY EXAM OF HAND: CPT | Mod: LT

## 2017-10-16 PROCEDURE — 99213 OFFICE O/P EST LOW 20 MIN: CPT | Performed by: NURSE PRACTITIONER

## 2017-10-16 NOTE — LETTER
[facility]  [address] [facility] [address]  Phone: [phone]    October 16, 2017        Ledy Oconnor  [address]   [place] [address]          To whom it may concern:    RE: Ledy Oconnor    Patient was seen and treated today at our clinic and was seen for thumb injury that occurred at work. Can return to work tomorrow 10/17/17, needs light duty job for tomorrow    Please contact me for questions or concerns.      Sincerely,        SHAVONNE Morgan CNP

## 2017-10-16 NOTE — MR AVS SNAPSHOT
After Visit Summary   10/16/2017    Ledy Oconnor    MRN: [id number]           Patient Information     Date Of Birth          1980        Visit Information        Provider Department      10/16/2017 6:25 PM Maddie Mcghee APRN [facility]        Today's Diagnoses     Pain of left hand    -  1      Care Instructions      Treatment for Bone Bruise (Bone Contusion)  A bone bruise is an injury to a bone that is less severe than a bone fracture. Bone bruises are fairly common. They can happen to people of all ages. Any type of bone in your body can get a bone bruise. Other injuries often happen along with a bone bruise, such as damage to nearby ligaments.  Types of treatment  Treatment for a bone bruise may include:    Resting the bone or joint    Putting an ice pack on the area several times a day    Raising the injury above the level of your heart to reduce swelling    Taking medicine to reduce pain and swelling    Wearing a brace or other device to limit movement, if needed  Your doctor may give you advice about your diet. This is because eating a diet that is rich in calcium, vitamin D, and protein can help you heal. Your doctor may ask you to not use certain over-the-counter medicines for pain. Some of these may delay normal bone healing. If you smoke, your doctor will advise you to stop smoking. Smoking can also delay bone healing.  Your health care provider will tell you how long you should avoid putting weight on your bone. Most bone bruises slowly heal over 2 to 4 months. A larger bone bruise may take longer to heal. You may not be able to return to sports activities for weeks or months. If your symptoms don t go away, your health care provider may give you an MRI.  Possible complications of a bone bruise  Most bone bruises heal without any problems. If your bone bruise is very large, your body may have trouble getting blood flow back to the area. This can cause avascular  "necrosis of the bone. This leads to death of that part of the bone.     When to call the health care provider  Call your health care provider if your symptoms don t start to get better in a few days. Call him or her right away if you have any severe symptoms, such as a high fever.      Date Last Reviewed: 7/21/2015 2000-2017 [employer]. [address] [place] [address]. All rights reserved. This information is not intended as a substitute for professional medical care. Always follow your healthcare professional's instructions.                Follow-ups after your visit        Your next 10 appointments already scheduled     Dec 04, 2017  4:00 PM CST   SHORT with Syed Brown MD   [facility]    [address] [facility] [address] [phone]              Who to contact     If you have questions or need follow up information about today's clinic visit or your schedule please contact [facility] directly at [phone].  Normal or non-critical lab and imaging results will be communicated to you by "[employer]hart, letter or phone within 4 business days after the clinic has received the results. If you do not hear from us within 7 days, please contact the clinic through "[employer]hart or phone. If you have a critical or abnormal lab result, we will notify you by phone as soon as possible.  Submit refill requests through [employer] or call your pharmacy and they will forward the refill request to us. Please allow 3 business days for your refill to be completed.          Additional Information About Your Visit        [employer] Information     [employer] lets you send messages to your doctor, view your test results, renew your prescriptions, schedule appointments and more. To sign up, go to [email or web address] . Click on \"Log in\" on the left side of the screen, which will take you to the Welcome page. Then click on \"Sign up Now\" on the right " "side of the page.     You will be asked to enter the access code listed below, as well as some personal information. Please follow the directions to create your username and password.     Your access code is: [email or web address]  Expires: 2018  8:04 PM     Your access code will  in 90 days. If you need help or a new code, please call your [place] clinic or [phone].        Care EveryWhere ID     This is your Care EveryWhere ID. This could be used by other organizations to access your [place] medical records  [id number]        Your Vitals Were     Pulse Temperature Height Pulse Oximetry BMI (Body Mass Index)       82 97.9  F (36.6  C) (Oral) 5' 2\" (1.575 m) 98% 30.91 kg/m2        Blood Pressure from Last 3 Encounters:   10/16/17 122/78   17 127/87   07/10/17 131/82    Weight from Last 3 Encounters:   10/16/17 169 lb (76.7 kg)   17 169 lb 9.6 oz (76.9 kg)   17 170 lb 12.8 oz (77.5 kg)               Primary Care Provider Office Phone # Fax #    Tyron Anderson [phone]       [address]  [facility] [address]        Equal Access to Services     [facility]: Hadii aad ku hadasho Soomaali, waaxda luqadaha, qaybta kaalmada adeegyada, adilson walsh . So [facility] [place] [phone].    ATENCIÓN: Si habla español, tiene a mckee disposición servicios gratuitos de asistencia lingüística. Llame al [phone].    We comply with applicable federal civil rights laws and [place] laws. We do not discriminate on the basis of race, color, national origin, age, disability, sex, sexual orientation, or gender identity.            Thank you!     Thank you for choosing [facility]  [facility] your care. Our goal is always to provide you with excellent care. Hearing back from our patients is one way we can continue to improve our services. Please take a few minutes to complete the written survey that you may receive in the mail after your visit with us. Thank " you!             Your Updated Medication List - Protect others around you: Learn how to safely use, store and throw away your medicines at www.disposemymeds.org.          This list is accurate as of: 10/16/17  8:04 PM.  Always use your most recent med list.                   Brand Name Dispense Instructions for use Diagnosis    ibuprofen 800 MG tablet    ADVIL/MOTRIN    90 tablet    Take 1 tablet (800 mg) by mouth every 8 hours as needed for moderate pain With food.    De Quervain's disease (tenosynovitis)

## 2017-10-17 NOTE — PATIENT INSTRUCTIONS
Treatment for Bone Bruise (Bone Contusion)  A bone bruise is an injury to a bone that is less severe than a bone fracture. Bone bruises are fairly common. They can happen to people of all ages. Any type of bone in your body can get a bone bruise. Other injuries often happen along with a bone bruise, such as damage to nearby ligaments.  Types of treatment  Treatment for a bone bruise may include:    Resting the bone or joint    Putting an ice pack on the area several times a day    Raising the injury above the level of your heart to reduce swelling    Taking medicine to reduce pain and swelling    Wearing a brace or other device to limit movement, if needed  Your doctor may give you advice about your diet. This is because eating a diet that is rich in calcium, vitamin D, and protein can help you heal. Your doctor may ask you to not use certain over-the-counter medicines for pain. Some of these may delay normal bone healing. If you smoke, your doctor will advise you to stop smoking. Smoking can also delay bone healing.  Your health care provider will tell you how long you should avoid putting weight on your bone. Most bone bruises slowly heal over 2 to 4 months. A larger bone bruise may take longer to heal. You may not be able to return to sports activities for weeks or months. If your symptoms don t go away, your health care provider may give you an MRI.  Possible complications of a bone bruise  Most bone bruises heal without any problems. If your bone bruise is very large, your body may have trouble getting blood flow back to the area. This can cause avascular necrosis of the bone. This leads to death of that part of the bone.     When to call the health care provider  Call your health care provider if your symptoms don t start to get better in a few days. Call him or her right away if you have any severe symptoms, such as a high fever.      Date Last Reviewed: 7/21/2015 2000-2017 [employer] [address]  [place] [address]. All rights reserved. This information is not intended as a substitute for professional medical care. Always follow your healthcare professional's instructions.

## 2017-10-17 NOTE — NURSING NOTE
"Chief Complaint   Patient presents with     Work Comp     injury left thumb at work at noon       Initial /78  Pulse 82  Temp 97.9  F (36.6  C) (Oral)  Ht 5' 2\" (1.575 m)  Wt 169 lb (76.7 kg)  SpO2 98%  BMI 30.91 kg/m2 Estimated body mass index is 30.91 kg/(m^2) as calculated from the following:    Height as of this encounter: 5' 2\" (1.575 m).    Weight as of this encounter: 169 lb (76.7 kg).  Medication Reconciliation: complete        Erin Abebe MA    "

## 2017-10-17 NOTE — PROGRESS NOTES
"    SUBJECTIVE:                                                    Ledy Oconnor is a 37 year old female who presents to clinic today for the following health issues:    Pt here for work comp-jammed thumb around noon. Has full range of motion. Tender, slightly swollen.     Problem list and histories reviewed & adjusted, as indicated.  Additional history: as documented    Patient Active Problem List   Diagnosis     Cluster headache, chronic     Fibroid uterus     Past Surgical History:   Procedure Laterality Date     NO HISTORY OF SURGERY         Social History   Substance Use Topics     Smoking status: Former Smoker     Packs/day: 0.00     Types: Cigarettes     Smokeless tobacco: Never Used     Alcohol use No     Family History   Problem Relation Age of Onset     Migraines Mother      unknown age     Migraines Brother 30             ROS:  Constitutional, HEENT, cardiovascular, pulmonary, gi and gu systems are negative, except as otherwise noted.      OBJECTIVE:   /78  Pulse 82  Temp 97.9  F (36.6  C) (Oral)  Ht 5' 2\" (1.575 m)  Wt 169 lb (76.7 kg)  SpO2 98%  BMI 30.91 kg/m2  Body mass index is 30.91 kg/(m^2).  GENERAL:Alert and no distress  RESP: lungs clear to auscultation - no rales, rhonchi or wheezes  CV: regular rate and rhythm, normal S1 S2, no S3 or S4, no murmur, click or rub, no peripheral edema and peripheral pulses strong  Hand exam - Left, full range of motion of all joints, mild swelling, tenderness thumb into hand, no deformities. There is normal motor, sensory, vascular and tendon function.    Diagnostic Test Results:  Xray - No visualized acute fracture, malalignment or other acute  osseous abnormality of the left hand.     ASSESSMENT/PLAN:     1. Pain of left hand  No fracture or dislocation, more contusion.   Advised rest, ice, elevate, ibuprofen  Given light duty job tomorrow if possible.    Monitor symptoms, call or rtc if worsening or not improving    Maddie Mcghee, APRN " [facility]

## 2017-10-18 ENCOUNTER — TELEPHONE (OUTPATIENT)
Dept: FAMILY MEDICINE | Facility: CLINIC | Age: 37
End: 2017-10-18

## 2017-10-18 NOTE — TELEPHONE ENCOUNTER
Per verbal order Dr. Syed Brown;  Have her check her pulse.  If ok and she doesn't think she's passing out then can be seen in urgent care tonight.  Otherwise, to ED for assessment.  He states to ask if any change she may be pregnant.    I called and spoke with patient.  She attempted to check her pulse multiple times and states finds it and then loses it.  She states that she does not feel she would pass out but feels like if she sat down she'd like to sleep.  She states is definite there is no chance of pregnancy; no sexual intercourse in a long period of time.  She states that she and her  have theater tickets tonight and not wanting to miss it.  She states if gets any worse will definitely go to ED; otherwise will be seen tomorrow.  States awareness and understanding that Dr. Syed Brown feels needs to be assessed tonight due to the heavy bleeding and fatigue but not wanting to miss the theater.  She denies passing out.  Denies feeling of dizziness.  States will go to ED tonight if symptoms worsen; otherwise will be seen tomorrow.  Zuleyma Juan RN

## 2017-10-18 NOTE — TELEPHONE ENCOUNTER
"Patient states that her period is very heavy.  This is day 14.  States is passing multiple clots each time she goes to bathroom.  Wearing a pad; saturating it every 4 hrs.  She states that there are moments that she feels that she could \"fall down\".  States feels very weak and fatigued.  She is not having any cramps.  States she is drinking lots of water to stay hydrated. Takes a multivit.  She had baby 3 mo early in January.  States her period started almost immediately after;  Has always been every 28 days.  Please advise.  U/x done in Oct with ovarian cyst.   Zuleyma Juan RN    "

## 2017-10-18 NOTE — TELEPHONE ENCOUNTER
Patient is calling stating that she has been on her period for 14 days and it is heavy bleeding. Patient is wondering if she needs to be seen.  Please call to advise  Thank you

## 2017-10-19 ENCOUNTER — OFFICE VISIT (OUTPATIENT)
Dept: OBGYN | Facility: CLINIC | Age: 37
End: 2017-10-19
Payer: COMMERCIAL

## 2017-10-19 VITALS
DIASTOLIC BLOOD PRESSURE: 89 MMHG | SYSTOLIC BLOOD PRESSURE: 139 MMHG | TEMPERATURE: 97.5 F | HEART RATE: 80 BPM | OXYGEN SATURATION: 100 % | BODY MASS INDEX: 30.54 KG/M2 | WEIGHT: 167 LBS

## 2017-10-19 DIAGNOSIS — Z30.430 ENCOUNTER FOR IUD INSERTION: ICD-10-CM

## 2017-10-19 DIAGNOSIS — D25.2 SUBSEROUS LEIOMYOMA OF UTERUS: ICD-10-CM

## 2017-10-19 DIAGNOSIS — N92.0 EXCESSIVE OR FREQUENT MENSTRUATION: Primary | ICD-10-CM

## 2017-10-19 PROCEDURE — 58300 INSERT INTRAUTERINE DEVICE: CPT | Performed by: OBSTETRICS & GYNECOLOGY

## 2017-10-19 PROCEDURE — 99213 OFFICE O/P EST LOW 20 MIN: CPT | Mod: 25 | Performed by: OBSTETRICS & GYNECOLOGY

## 2017-10-19 NOTE — LETTER
October 19, 2017              To Whom It May Concern,     Ledy Nathell was seen in the clinic today.      Sincerely,        Cephas Mawuena Agbeh, MD

## 2017-10-19 NOTE — NURSING NOTE
"Chief Complaint   Patient presents with     Gyn Exam     heavy bleeding day 16       Initial /89  Pulse 80  Temp 97.5  F (36.4  C) (Tympanic)  Wt 167 lb (75.8 kg)  LMP 10/03/2017  SpO2 100%  Breastfeeding? No  BMI 30.54 kg/m2 Estimated body mass index is 30.54 kg/(m^2) as calculated from the following:    Height as of 10/16/17: 5' 2\" (1.575 m).    Weight as of this encounter: 167 lb (75.8 kg).  Medication Reconciliation: complete   Sarina Corbin LPN    "

## 2017-10-19 NOTE — MR AVS SNAPSHOT
"              After Visit Summary   10/19/2017    Ledy Oconnor    MRN: [id number]           Patient Information     Date Of Birth          1980        Visit Information        Provider Department      10/19/2017 10:15 AM Agbeh, Cephas Mawuena, MD [facility]        Today's Diagnoses     Excessive or frequent menstruation    -  1    Subserous leiomyoma of uterus        Encounter for IUD insertion           Follow-ups after your visit        Your next 10 appointments already scheduled     Dec 04, 2017  4:00 PM CST   [facility]ed [facility]   [facility]    [address] [facility] [address] [phone]              Who to contact     If you have questions or need follow up information about today's clinic visit or your schedule please contact [facility] directly at [phone].  Normal or non-critical lab and imaging results will be communicated to you by MyChart, letter or phone within 4 business days after the clinic has received the results. If you do not hear from us within 7 days, please contact the clinic through MyChart or phone. If you have a critical or abnormal lab result, we will notify you by phone as soon as possible.  Submit refill requests through [employer] or call your pharmacy and they will forward the refill request to us. Please allow 3 business days for your refill to be completed.          Additional Information About Your Visit        MyChart Information     [employer] lets you send messages to your doctor, view your test results, renew your prescriptions, schedule appointments and more. To sign up, go to [email or web address] . Click on \"Log in\" on the left side of the screen, which will take you to the Welcome page. Then click on \"Sign up Now\" on the right side of the page.     You will be asked to enter the access code listed below, as well as some personal information. Please follow the directions to " create your username and password.     Your access code is: [email or web address]  Expires: 2018  8:04 PM     Your access code will  in 90 days. If you need help or a new code, please call your [place] clinic or [phone].        Care EveryWhere ID     This is your Care EveryWhere ID. This could be used by other organizations to access your [place] medical records  [id number]        Your Vitals Were     Pulse Temperature Last Period Pulse Oximetry Breastfeeding? BMI (Body Mass Index)    80 97.5  F (36.4  C) (Tympanic) 10/03/2017 100% No 30.54 kg/m2       Blood Pressure from Last 3 Encounters:   10/19/17 139/89   10/16/17 122/78   17 127/87    Weight from Last 3 Encounters:   10/19/17 167 lb (75.8 kg)   10/16/17 169 lb (76.7 kg)   17 169 lb 9.6 oz (76.9 kg)              We Performed the Following     HC LEVONORGESTREL IU 52MG 5 YR     INSERTION INTRAUTERINE DEVICE        Primary Care Provider Office Phone # Fax #    Tyron Anderson [phone]       [address]  [facility] [address]        Equal Access to Services     [facility] : Hadii aad ku hadasho Soomaali, waaxda luqadaha, qaybta kaalmada adeegyada, waxay idiin hayaan stan walsh . So [facility] [phone].    ATENCIÓN: Si habla español, tiene a mckee disposición servicios gratuitos de asistencia lingüística. Llame al [phone].    We comply with applicable federal civil rights laws and [place] laws. We do not discriminate on the basis of race, color, national origin, age, disability, sex, sexual orientation, or gender identity.            Thank you!     Thank you for choosing [facility]  for your care. Our goal is always to provide you with excellent care. Hearing back from our patients is one way we can continue to improve our services. Please take a few minutes to complete the written survey that you may receive in the mail after your visit with us. Thank you!             Your Updated  Medication List - Protect others around you: Learn how to safely use, store and throw away your medicines at www.disposemymeds.org.          This list is accurate as of: 10/19/17  1:25 PM.  Always use your most recent med list.                   Brand Name Dispense Instructions for use Diagnosis    ibuprofen 800 MG tablet    ADVIL/MOTRIN    90 tablet    Take 1 tablet (800 mg) by mouth every 8 hours as needed for moderate pain With food.    De Quervain's disease (tenosynovitis)

## 2017-10-19 NOTE — PROGRESS NOTES
Ledy is a 37 year old  referred here by Dr. MIGUEL for consultation regarding ongoing bleeding for at least 16 days with clots. Has had ultrasound showing fibroids and ovarian cyst. Used spouse vasectomy since her last child was born in January at 28 weeks. She used OCP and Depo Provera in the past..  US PELVIC COMPLETE WITH TRANSVAGINAL 10/6/2017 3:39 PM      HISTORY: Other ovarian cyst, unspecified side     FINDINGS: Transvaginal images were performed to better evaluate the  patient's uterus, ovaries and endometrial stripe.     The uterus is normal in size measuring 9.4 x 4.5 x 5.9 cm. Two fundal  fibroids are present which appear subserosal in location. One in the  right fundus measures 3.5 x 2.3 x 2.4 cm. Another in the left fundus  measures 3.6 x 2.5 x 3.2 cm. Endometrial stripe measures 10 mm and is  normal for patient's age. The right ovary measures 4.3 x 3.3 x 4.2 cm  and contains a dominant follicle or cyst measuring 3.5 cm. The left  ovary is normal measuring 3.3 x 2.0 x 2.0 cm. Dominant follicle  measures 1.6 cm. The ovaries demonstrate normal color Doppler flow  bilaterally. No adnexal masses are present. No free pelvic fluid is  present.         IMPRESSION: Fundal fibroids are subserosal in location measuring 3.5  and 3.6 cm in size. Dominant follicle or cyst right ovary measures 3.5  cm. Ovaries are otherwise unremarkable. Follow-up ultrasound in 2 or 3  months as needed to assess for resolution.     SOBEIDA BENITEZ MD    ROS: Ten point review of systems was reviewed and negative except the above.    Gyne: - abn pap (last pap ), - STD's    Past Medical History:   Diagnosis Date     Headache      Past Surgical History:   Procedure Laterality Date     NO HISTORY OF SURGERY       Patient Active Problem List   Diagnosis     Cluster headache, chronic     Fibroid uterus       ALL/Meds: Her medication and allergy histories were reviewed and are documented in their appropriate chart areas.    SH: - tob, -  EtOH,     FH: Her family history was reviewed and documented in its appropriate chart area.    PE: /89  Pulse 80  Temp 97.5  F (36.4  C) (Tympanic)  Wt 167 lb (75.8 kg)  LMP 10/03/2017  SpO2 100%  Breastfeeding? No  BMI 30.54 kg/m2  Body mass index is 30.54 kg/(m^2).    General:  WNWD female, NAD  Alert  Oriented x 3  Gait:  Normal  Skin:  Normal skin turgor  HEENT:  NC/AT, EOMI  Abdomen:  Non-tender, non-distended.  Vulva: No external lesions, normal hair distribution, no adenopathy  BUS:  Normal, no masses noted  Urethra:  No hypermobility seen  Urethral meatus:  No masses noted  Vagina: Moist, pink, old blood in vault. , well rugated, no lesions  Cervix: Smooth, pink, no visible lesions  Uterus: Normal size, anteverted, non-tender, mobile  Ovaries: No mass, non-tender, mobile  Perianal:  No masses noted  Extremities:  No clubbing, no cyanosis and no edema.    A/P    ICD-10-CM    1. Excessive or frequent menstruation N92.0    2. Subserous leiomyoma of uterus D25.2        Reviewed risks and benefits of medical versus surgical therapy.  Medical therapy reviewed included hormonal manipulation with OCP's, Patch, Ring, Depo, or IUD.   Reviewed endometrial ablation versus hysterectomy.  Discussed that endometrial ablation is minimally invasive compared to hysterectomy but may not be definitive.  Patient wants hormonal IUD.  25 minutes was spent face to face with the patient today discussing her history, diagnosis, and follow-up plan as noted above.  Over 50% of the visit was spent in counseling and coordination of care.    Total Visit Time: 30 minutes.      CEPHAS AGBEH, MD.    PROCEDURE:  Type of IUD: Mirena    The patient has taken 600-800mg of Ibuprofen prior to the procedure.   She is placed in a dorsal lithotomy potion and a pelvic exam is performed to determine the position of the uterus.  The cervix is identified and cleaned with betadine.  A single tooth tenaculum is applied to the anterior  lip of the cervix for stabilization.   The uterus sounded to 8.0 cm. (Target sound depth is 6.5 cm to 8.5 cm.)  The IUD insertion tube is prepared to manufacturers recommendations and inserted into the uterus under sterile conditions in the usual fashion.  The IUD string is then cut to 3.5 cm.    The patient tolerated this procedure without immediate complication.  The patient is to return or call immediately for any unexplained fever, abdominal or pelvic pain, excessive bleeding, possibility of pregnancy, foul-smelling discharge, sense that the IUD has been expelled.  All questions were answered.        ICD-10-CM    1. Excessive or frequent menstruation N92.0 INSERTION INTRAUTERINE DEVICE     HC LEVONORGESTREL IU 52MG 5 YR   2. Subserous leiomyoma of uterus D25.2    3. Encounter for IUD insertion Z30.430 INSERTION INTRAUTERINE DEVICE     HC LEVONORGESTREL IU 52MG 5 YR       Return to clinic in 1 month for IUD check          CEPHAS AGBEH, MD.

## 2017-10-30 ENCOUNTER — TELEPHONE (OUTPATIENT)
Dept: FAMILY MEDICINE | Facility: CLINIC | Age: 37
End: 2017-10-30

## 2017-10-30 NOTE — TELEPHONE ENCOUNTER
Patient is scheduled on 12/4/17 with Syed Brown MD.  This appointment needs to be rescheduled as the provider is going to be unavailable.  Patient was contacted by: Phone. Left message for patient to call and schedule appointment.    Emily Garnica,

## 2017-11-14 NOTE — TELEPHONE ENCOUNTER
LM for patient to call back directly, have cancelled the appointment off of the provider's schedule have left TC direct call back number for patient.    Emily Garnica,

## 2017-12-08 ENCOUNTER — OFFICE VISIT (OUTPATIENT)
Dept: OBGYN | Facility: CLINIC | Age: 37
End: 2017-12-08
Payer: COMMERCIAL

## 2017-12-08 VITALS
SYSTOLIC BLOOD PRESSURE: 128 MMHG | HEART RATE: 81 BPM | BODY MASS INDEX: 29.63 KG/M2 | OXYGEN SATURATION: 97 % | TEMPERATURE: 98.4 F | WEIGHT: 162 LBS | DIASTOLIC BLOOD PRESSURE: 84 MMHG

## 2017-12-08 DIAGNOSIS — Z30.431 IUD CHECK UP: ICD-10-CM

## 2017-12-08 DIAGNOSIS — N89.8 VAGINAL ITCHING: Primary | ICD-10-CM

## 2017-12-08 LAB
SPECIMEN SOURCE: NORMAL
WET PREP SPEC: NORMAL

## 2017-12-08 PROCEDURE — 87210 SMEAR WET MOUNT SALINE/INK: CPT | Performed by: OBSTETRICS & GYNECOLOGY

## 2017-12-08 PROCEDURE — 99214 OFFICE O/P EST MOD 30 MIN: CPT | Performed by: OBSTETRICS & GYNECOLOGY

## 2017-12-08 RX ORDER — BENZONATATE 100 MG/1
CAPSULE ORAL
Refills: 0 | COMMUNITY
Start: 2017-11-28 | End: 2018-12-17

## 2017-12-08 RX ORDER — FLUCONAZOLE 150 MG/1
150 TABLET ORAL ONCE
Qty: 1 TABLET | Refills: 0 | Status: SHIPPED | OUTPATIENT
Start: 2017-12-08 | End: 2017-12-08

## 2017-12-08 NOTE — MR AVS SNAPSHOT
After Visit Summary   12/8/2017    Ledy Oconnor    MRN: [id number]           Patient Information     Date Of Birth          1980        Visit Information        Provider Department      12/8/2017 2:45 PM Agbeh, Cephas Mawuena, MD [facility]        Today's Diagnoses     Vaginal itching    -  1    IUD check up           Follow-ups after your visit        Who to contact     If you have questions or need follow up information about today's clinic visit or your schedule please contact [facility] directly at [phone].  Normal or non-critical lab and imaging results will be communicated to you by [employer], letter or phone within 4 business days after the clinic has received the results. If you do not hear from us within 7 days, please contact the clinic through [employer] or phone. If you have a critical or abnormal lab result, we will notify you by phone as soon as possible.  Submit refill requests through [employer] or call your pharmacy and they will forward the refill request to us. Please allow 3 business days for your refill to be completed.          Additional Information About Your Visit        MyChart Information     [employer] gives you secure access to your electronic health record. If you see a primary care provider, you can also send messages to your care team and make appointments. If you have questions, please call your primary care clinic.  If you do not have a primary care provider, please call [phone] and they will assist you.        Care EveryWhere ID     This is your Care EveryWhere ID. This could be used by other organizations to access your [facility] medical records  [id number]        Your Vitals Were     Pulse Temperature Pulse Oximetry BMI (Body Mass Index)          81 98.4  F (36.9  C) (Tympanic) 97% 29.63 kg/m2         Blood Pressure from Last 3 Encounters:   12/08/17 128/84   10/19/17 139/89   10/16/17 122/78    Weight from Last 3 Encounters:    12/08/17 162 lb (73.5 kg)   10/19/17 167 lb (75.8 kg)   10/16/17 169 lb (76.7 kg)              We Performed the Following     Wet prep          Today's Medication Changes          These changes are accurate as of: 12/8/17  3:42 PM.  If you have any questions, ask your nurse or doctor.               Start taking these medicines.        Dose/Directions    fluconazole 150 MG tablet   Commonly known as:  DIFLUCAN   Used for:  Vaginal itching, IUD check up   Started by:  Agbeh, Cephas Mawuena, MD        Dose:  150 mg   Take 1 tablet (150 mg) by mouth once for 1 dose   Quantity:  1 tablet   Refills:  0            Where to get your medicines      These medications were sent to [facility] - [address]  [address] [place] [address]     Phone:  [phone]     fluconazole 150 MG tablet                Primary Care Provider Office Phone # Fax #    Tyron Anderson [phone]       [address]  [facility] [address]        Equal Access to Services     [facility]: Hadii aad ku hadasho Soomaali, waaxda luqadaha, qaybta kaalmada adeegyada, waxjeannine walsh . [facility] [phone].    ATENCIÓN: Si habla español, tiene a mckee disposición servicios gratuitos de asistencia lingüística. [facility] [phone].    We comply with applicable federal civil rights laws and [place] laws. We do not discriminate on the basis of race, color, national origin, age, disability, sex, sexual orientation, or gender identity.            Thank you!     Thank you for choosing [facility] (formerly [facility])  for your care. Our goal is always to provide you with excellent care. Hearing back from our patients is one way we can continue to improve our services. Please take a few minutes to complete the written survey that you may receive in the mail after your visit with us. Thank you!             Your Updated Medication List - Protect others around you: Learn  how to safely use, store and throw away your medicines at www.disposemymeds.org.          This list is accurate as of: 12/8/17  3:42 PM.  Always use your most recent med list.                   Brand Name Dispense Instructions for use Diagnosis    amoxicillin-clavulanate 875-125 MG per tablet    AUGMENTIN     TK 1 T PO  BID FOR 7 DAYS    Vaginal itching, IUD check up       benzonatate 100 MG capsule    TESSALON      Vaginal itching, IUD check up       fluconazole 150 MG tablet    DIFLUCAN    1 tablet    Take 1 tablet (150 mg) by mouth once for 1 dose    Vaginal itching, IUD check up       ibuprofen 800 MG tablet    ADVIL/MOTRIN    90 tablet    Take 1 tablet (800 mg) by mouth every 8 hours as needed for moderate pain With food.    De Quervain's disease (tenosynovitis)

## 2017-12-08 NOTE — PROGRESS NOTES
Ledy is a 37 year old  here for IUD surveillance.  She had the MIRENA IUD inserted one month ago.    She has not had any problems with the IUD.  Has some improving bleeding.  No cramping. Complaints of vaginal itching since being placed on antibiotics 6 days ago. She suspects yeast infection.   PMH: Her past medical, surgical, and obstetric histories were reviewed and are documented in their appropriate chart areas.    ALL/Meds: Her medication and allergy histories were reviewed and are documented in their appropriate chart areas.    ROS:4 point ROS including Respiratory, CV, GI and , other than that noted in the HPI,  is negative     EXAM:  /84 (BP Location: Left arm, Patient Position: Chair, Cuff Size: Adult Regular)  Pulse 81  Temp 98.4  F (36.9  C) (Tympanic)  Wt 162 lb (73.5 kg)  SpO2 97%  BMI 29.63 kg/m2  General:  WNWD female, NAD  Alert  Oriented x 3  Gait:  Normal  Skin:  Normal skin turgor  HEENT:  NC/AT, EOMI  Abdomen:  Non-tender, non-distended.  Vulva: No external lesions, normal hair distribution, no adenopathy  BUS:  Normal, no masses noted  Urethra:  No hypermobility seen  Urethral meatus:  No masses noted  Vagina: Moist, pink, no abnormal discharge, well rugated, no lesions  Cervix: Smooth, pink, no visible lesions, no CMT, IUD strings seen  Uterus: Normal size, anteverted, non-tender, mobile  Ovaries: No mass, non-tender, mobile  Perianal:  No masses noted  Extremities:  No clubbing, no cyanosis and no edema.    Results for orders placed or performed in visit on 17   Wet prep   Result Value Ref Range    Specimen Description Vagina     Wet Prep No Trichomonas seen     Wet Prep No clue cells seen     Wet Prep No yeast seen          ASSESSMENT:    ICD-10-CM    1. Vaginal itching L29.8 amoxicillin-clavulanate (AUGMENTIN) 875-125 MG per tablet     benzonatate (TESSALON) 100 MG capsule     Wet prep     fluconazole (DIFLUCAN) 150 MG tablet   2. IUD check up Z30.431  amoxicillin-clavulanate (AUGMENTIN) 875-125 MG per tablet     benzonatate (TESSALON) 100 MG capsule     Wet prep     fluconazole (DIFLUCAN) 150 MG tablet        PLAN:   She does not have any apparent contraindications for continued use of the IUD.    Presumptive yeast treatment for symptoms.  May use NSAIDS for bleeding. Consider estrogen if persists.    25 minutes was spent face to face with the patient today discussing her history, diagnosis, and follow-up plan as noted above.  Over 50% of the visit was spent in counseling and coordination of care.    Total Visit Time: 30 minutes.

## 2017-12-08 NOTE — NURSING NOTE
"Chief Complaint   Patient presents with     Gyn Exam     check Mirena       Initial /84 (BP Location: Left arm, Patient Position: Chair, Cuff Size: Adult Regular)  Pulse 81  Temp 98.4  F (36.9  C) (Tympanic)  Wt 162 lb (73.5 kg)  SpO2 97%  BMI 29.63 kg/m2 Estimated body mass index is 29.63 kg/(m^2) as calculated from the following:    Height as of 10/16/17: 5' 2\" (1.575 m).    Weight as of this encounter: 162 lb (73.5 kg).  Medication Reconciliation: complete   Sarina Corbin LPN    "

## 2018-04-04 ENCOUNTER — MYC MEDICAL ADVICE (OUTPATIENT)
Dept: OBGYN | Facility: CLINIC | Age: 38
End: 2018-04-04

## 2018-04-04 ENCOUNTER — TELEPHONE (OUTPATIENT)
Dept: FAMILY MEDICINE | Facility: CLINIC | Age: 38
End: 2018-04-04

## 2018-04-04 NOTE — TELEPHONE ENCOUNTER
Patient is trying to reply on my chart and it will not let her.  She would like to talk to Lyndsey the RN she was talking to.  Please advise. Ok to leave a message.

## 2018-04-05 NOTE — TELEPHONE ENCOUNTER
The medication is over a year old, would not recommend taking it. Would recommend an appointment first to discuss concerns, update PHQ-9. It has been over a year since I have seen her, so I would not be able to start a new prescription without seeing her first. As she is no longer pregnant and is more than 1 year postpartum, her primary provider would be a good choice for this. Ruby MARVIN CNP

## 2018-04-05 NOTE — TELEPHONE ENCOUNTER
Spoke with patient and she understands and agrees with the plan from SHAVONNE Caceres, CNP. She will call her primary.   Lyndsey Vera RN

## 2018-04-05 NOTE — TELEPHONE ENCOUNTER
"Phone call to patient. She would like to start on her Sertraline that she received a year ago from SHAVONNE Caceres CNP. Ledy states she never started it for her postpartum depression. Now, she would like to start it as she is feeling panicky, frustration and \" a ball full of anxiety\". She has tried everything to not go on medication. She feels she needs to start on medication at this point.  She is asking if she were to take the Sertralineshould she continue the same directions from before. To begin with 25 mg for 2 weeks and then 50 mg daily. RN suggested an appointment to see SHAVONNE Caceres CNP but would like to start on the medication she has and then follow up after taking.  Okay to My Chart Ledy back or leave a message with plan. Routed to SHAVONNE Caceres CNP for orders and review of plan.  "

## 2018-12-17 ENCOUNTER — OFFICE VISIT (OUTPATIENT)
Dept: FAMILY MEDICINE | Facility: CLINIC | Age: 38
End: 2018-12-17

## 2018-12-17 VITALS
WEIGHT: 162 LBS | HEART RATE: 78 BPM | DIASTOLIC BLOOD PRESSURE: 82 MMHG | RESPIRATION RATE: 16 BRPM | TEMPERATURE: 98.1 F | SYSTOLIC BLOOD PRESSURE: 126 MMHG | OXYGEN SATURATION: 97 % | BODY MASS INDEX: 29.63 KG/M2

## 2018-12-17 DIAGNOSIS — R09.89 TONSIL PAIN: ICD-10-CM

## 2018-12-17 DIAGNOSIS — J01.90 SUBACUTE SINUSITIS, UNSPECIFIED LOCATION: Primary | ICD-10-CM

## 2018-12-17 PROCEDURE — 99213 OFFICE O/P EST LOW 20 MIN: CPT | Performed by: FAMILY MEDICINE

## 2018-12-17 RX ORDER — CEPHALEXIN 500 MG/1
500 CAPSULE ORAL 2 TIMES DAILY
Qty: 28 CAPSULE | Refills: 0 | Status: SHIPPED | OUTPATIENT
Start: 2018-12-17 | End: 2019-03-25

## 2018-12-17 NOTE — PROGRESS NOTES
CHIEF COMPLAINT    Sinus congestion, cough, sore tonsil      HISTORY    Ledy has had sinus headaches for about 2 weeks.  She has some sore throat which has lasted a month or so, worse on the left side.  She locates the pain in the left tonsil and left neck.  She is not having ear pain.  No fever.      Patient Active Problem List   Diagnosis     Cluster headache, chronic     Fibroid uterus       Current Outpatient Medications   Medication Sig Dispense Refill     cephALEXin (KEFLEX) 500 MG capsule Take 1 capsule (500 mg) by mouth 2 times daily for 14 days 28 capsule 0     ibuprofen (ADVIL/MOTRIN) 800 MG tablet Take 1 tablet (800 mg) by mouth every 8 hours as needed for moderate pain With food. (Patient taking differently: Take 10 mg by mouth every 8 hours as needed for moderate pain With food.) 90 tablet 1       REVIEW OF SYSTEMS    No cough or wheezing.  No chest pain.  No nausea or abdominal pain.      Past Medical History:   Diagnosis Date     Headache        EXAM  /82   Pulse 78   Temp 98.1  F (36.7  C) (Tympanic)   Resp 16   Wt 73.5 kg (162 lb)   SpO2 97%   BMI 29.63 kg/m      Tympanic membranes negative.  Tonsils 1+ without obvious redness or exudate.  Mildly enlarged anterior cervical nodes palpable.      (J01.90) Subacute sinusitis, unspecified location  (primary encounter diagnosis)  Comment:   Plan: cephALEXin (KEFLEX) 500 MG capsule  Continue symptomatic measures for sinuses.            (R09.89) Tonsil pain  Comment:   Exam was not strikingly positive.  A trial of antibiotic was advised.  Could consider ENT opinion if symptoms persist or worsen.  Plan: cephALEXin (KEFLEX) 500 MG capsule

## 2018-12-17 NOTE — LETTER
Dec 17, 2018        Ledy Oconnor          Was seen for medical condition.                  Papa Berrios MD on 12/17/2018 at 10:25 AM

## 2018-12-17 NOTE — NURSING NOTE
"Chief Complaint   Patient presents with     Cough     x 2+ weeks     Throat Pain     LT sided tonsil pain. for the past 6 months       Initial /82   Pulse 78   Temp 98.1  F (36.7  C) (Tympanic)   Resp 16   Wt 73.5 kg (162 lb)   SpO2 97%   BMI 29.63 kg/m   Estimated body mass index is 29.63 kg/m  as calculated from the following:    Height as of 10/16/17: 1.575 m (5' 2\").    Weight as of this encounter: 73.5 kg (162 lb)..  BP completed using cuff size: regular    "

## 2019-03-25 ENCOUNTER — OFFICE VISIT (OUTPATIENT)
Dept: OBGYN | Facility: CLINIC | Age: 39
End: 2019-03-25
Payer: COMMERCIAL

## 2019-03-25 VITALS
HEIGHT: 62 IN | DIASTOLIC BLOOD PRESSURE: 95 MMHG | SYSTOLIC BLOOD PRESSURE: 137 MMHG | BODY MASS INDEX: 30.95 KG/M2 | HEART RATE: 76 BPM | WEIGHT: 168.2 LBS | OXYGEN SATURATION: 96 % | TEMPERATURE: 99.4 F

## 2019-03-25 DIAGNOSIS — N61.1 CARBUNCLE OF BREAST: Primary | ICD-10-CM

## 2019-03-25 DIAGNOSIS — N63.20 LEFT BREAST LUMP: ICD-10-CM

## 2019-03-25 DIAGNOSIS — Z12.39 SCREENING FOR BREAST CANCER: ICD-10-CM

## 2019-03-25 PROCEDURE — 99213 OFFICE O/P EST LOW 20 MIN: CPT | Performed by: NURSE PRACTITIONER

## 2019-03-25 ASSESSMENT — MIFFLIN-ST. JEOR: SCORE: 1386.2

## 2019-03-25 ASSESSMENT — PAIN SCALES - GENERAL: PAINLEVEL: NO PAIN (1)

## 2019-03-25 NOTE — PROGRESS NOTES
"  SUBJECTIVE:   Ledy Oconnor is a 38 year old female who presents to clinic today for the following health issues:      Concern - Left breast lump  Onset: 03/22/2019    Description:   quarter size, hard    Intensity: mild    Progression of Symptoms:  same    Accompanying Signs & Symptoms:  none    Previous history of similar problem:   none    Precipitating factors:   Worsened by: touch, movement    Alleviating factors:  Improved by: none    Therapies Tried and outcome: none    Noticed left breast lump when changing bra 3 days ago. Area may have slightly increased in size over the weekend, but has also been manipulating it. Feels firm, tender to touch. In the last 2 days has been slightly red. No trauma to the area, no fevers, body aches. No nipple discharge.   Did not breast feed, no prior breast surgeries. Using Mirena as contraception. No previous mammogram.    Problem list and histories reviewed & adjusted, as indicated.  Additional history: as documented    Patient Active Problem List   Diagnosis     Cluster headache, chronic     Fibroid uterus     Past Surgical History:   Procedure Laterality Date     NO HISTORY OF SURGERY         Social History     Tobacco Use     Smoking status: Former Smoker     Smokeless tobacco: Never Used   Substance Use Topics     Alcohol use: No     Alcohol/week: 0.0 oz     Family History   Problem Relation Age of Onset     Migraines Mother         unknown age     Migraines Brother 30           Reviewed and updated as needed this visit by clinical staff       Reviewed and updated as needed this visit by Provider         ROS:  Constitutional, HEENT, cardiovascular, pulmonary, gi and gu systems are negative, except as otherwise noted.    OBJECTIVE:     BP (!) 137/95 (BP Location: Right arm, Patient Position: Sitting, Cuff Size: Adult Regular)   Pulse 76   Temp 99.4  F (37.4  C) (Oral)   Ht 1.575 m (5' 2\")   Wt 76.3 kg (168 lb 3.2 oz)   SpO2 96%   BMI 30.76 kg/m    Body mass index " is 30.76 kg/m .  GENERAL: healthy, alert and no distress  NECK: no adenopathy, no asymmetry, masses, or scars and thyroid normal to palpation  RESP: lungs clear to auscultation - no rales, rhonchi or wheezes  (R) BREAST: normal without masses, tenderness or nipple discharge and no palpable axillary masses or adenopathy  (L) BREAST: without nipple discharge and no palpable axillary masses or adenopathy. On the inner lower quadrant is an erythematous superficial, fixed lesion. Tender to touch  CV: regular rate and rhythm, normal S1 S2, no S3 or S4, no murmur, click or rub, no peripheral edema and peripheral pulses strong  ABDOMEN: soft, nontender, no hepatosplenomegaly, no masses and bowel sounds normal  MS: no gross musculoskeletal defects noted, no edema  PSYCH: mentation appears normal, affect normal/bright    ASSESSMENT/PLAN:   1. Left breast lump  See below  - MA Diagnostic Left w/Jim; Future    2. Carbuncle of breast  Discussed exam more consistent with carbuncle than deeper breast mass. Plan treatment with heat and Augmentin BID x 10 days. If any redness, tenderness or firmness remains, to plan diagnostic left breast mammogram and ultrasound. Otherwise, would recommend baseline screening mammogram in the next few months.   - amoxicillin-clavulanate (AUGMENTIN) 875-125 MG tablet; Take 1 tablet by mouth 2 times daily  Dispense: 20 tablet; Refill: 0    3. Screening for breast cancer  Will do screening on right as well  - MA Screen Right w/Jim; Future    SHAVONNE Wiggins [facility]

## 2019-04-08 ENCOUNTER — ANCILLARY PROCEDURE (OUTPATIENT)
Dept: MAMMOGRAPHY | Facility: CLINIC | Age: 39
End: 2019-04-08
Attending: NURSE PRACTITIONER
Payer: COMMERCIAL

## 2019-04-08 ENCOUNTER — ANCILLARY PROCEDURE (OUTPATIENT)
Dept: ULTRASOUND IMAGING | Facility: CLINIC | Age: 39
End: 2019-04-08
Attending: NURSE PRACTITIONER
Payer: COMMERCIAL

## 2019-04-08 DIAGNOSIS — N63.20 LEFT BREAST LUMP: ICD-10-CM

## 2019-04-08 PROCEDURE — 76642 ULTRASOUND BREAST LIMITED: CPT | Mod: LT | Performed by: RADIOLOGY

## 2019-04-08 PROCEDURE — G0279 TOMOSYNTHESIS, MAMMO: HCPCS | Performed by: RADIOLOGY

## 2019-04-08 PROCEDURE — 77066 DX MAMMO INCL CAD BI: CPT | Performed by: RADIOLOGY

## 2019-08-15 ENCOUNTER — OFFICE VISIT (OUTPATIENT)
Dept: FAMILY MEDICINE | Facility: CLINIC | Age: 39
End: 2019-08-15
Payer: COMMERCIAL

## 2019-08-15 VITALS
DIASTOLIC BLOOD PRESSURE: 80 MMHG | WEIGHT: 167 LBS | BODY MASS INDEX: 30.73 KG/M2 | TEMPERATURE: 98.3 F | HEIGHT: 62 IN | SYSTOLIC BLOOD PRESSURE: 122 MMHG

## 2019-08-15 DIAGNOSIS — B96.89 BV (BACTERIAL VAGINOSIS): ICD-10-CM

## 2019-08-15 DIAGNOSIS — N76.0 BV (BACTERIAL VAGINOSIS): ICD-10-CM

## 2019-08-15 DIAGNOSIS — R53.83 FATIGUE, UNSPECIFIED TYPE: Primary | ICD-10-CM

## 2019-08-15 DIAGNOSIS — E28.2 PCOS (POLYCYSTIC OVARIAN SYNDROME): ICD-10-CM

## 2019-08-15 LAB
BASOPHILS # BLD AUTO: 0 10E9/L (ref 0–0.2)
BASOPHILS NFR BLD AUTO: 0.1 %
DIFFERENTIAL METHOD BLD: NORMAL
EOSINOPHIL # BLD AUTO: 0.3 10E9/L (ref 0–0.7)
EOSINOPHIL NFR BLD AUTO: 2.4 %
ERYTHROCYTE [DISTWIDTH] IN BLOOD BY AUTOMATED COUNT: 12.7 % (ref 10–15)
HBA1C MFR BLD: 5.4 % (ref 0–5.6)
HCT VFR BLD AUTO: 40.6 % (ref 35–47)
HGB BLD-MCNC: 13.6 G/DL (ref 11.7–15.7)
LYMPHOCYTES # BLD AUTO: 3.4 10E9/L (ref 0.8–5.3)
LYMPHOCYTES NFR BLD AUTO: 31.5 %
MCH RBC QN AUTO: 32.5 PG (ref 26.5–33)
MCHC RBC AUTO-ENTMCNC: 33.5 G/DL (ref 31.5–36.5)
MCV RBC AUTO: 97 FL (ref 78–100)
MONOCYTES # BLD AUTO: 0.7 10E9/L (ref 0–1.3)
MONOCYTES NFR BLD AUTO: 6.2 %
NEUTROPHILS # BLD AUTO: 6.4 10E9/L (ref 1.6–8.3)
NEUTROPHILS NFR BLD AUTO: 59.8 %
PLATELET # BLD AUTO: 314 10E9/L (ref 150–450)
RBC # BLD AUTO: 4.19 10E12/L (ref 3.8–5.2)
SPECIMEN SOURCE: ABNORMAL
WBC # BLD AUTO: 10.7 10E9/L (ref 4–11)
WET PREP SPEC: ABNORMAL

## 2019-08-15 PROCEDURE — 85025 COMPLETE CBC W/AUTO DIFF WBC: CPT | Performed by: FAMILY MEDICINE

## 2019-08-15 PROCEDURE — 80053 COMPREHEN METABOLIC PANEL: CPT | Performed by: FAMILY MEDICINE

## 2019-08-15 PROCEDURE — 82607 VITAMIN B-12: CPT | Performed by: FAMILY MEDICINE

## 2019-08-15 PROCEDURE — 87210 SMEAR WET MOUNT SALINE/INK: CPT | Performed by: FAMILY MEDICINE

## 2019-08-15 PROCEDURE — 36415 COLL VENOUS BLD VENIPUNCTURE: CPT | Performed by: FAMILY MEDICINE

## 2019-08-15 PROCEDURE — 83540 ASSAY OF IRON: CPT | Performed by: FAMILY MEDICINE

## 2019-08-15 PROCEDURE — 84443 ASSAY THYROID STIM HORMONE: CPT | Performed by: FAMILY MEDICINE

## 2019-08-15 PROCEDURE — 99214 OFFICE O/P EST MOD 30 MIN: CPT | Performed by: FAMILY MEDICINE

## 2019-08-15 PROCEDURE — 82728 ASSAY OF FERRITIN: CPT | Performed by: FAMILY MEDICINE

## 2019-08-15 PROCEDURE — 83550 IRON BINDING TEST: CPT | Performed by: FAMILY MEDICINE

## 2019-08-15 PROCEDURE — 82306 VITAMIN D 25 HYDROXY: CPT | Performed by: FAMILY MEDICINE

## 2019-08-15 PROCEDURE — 83036 HEMOGLOBIN GLYCOSYLATED A1C: CPT | Performed by: FAMILY MEDICINE

## 2019-08-15 RX ORDER — SPIRONOLACTONE 25 MG/1
25 TABLET ORAL DAILY
Qty: 90 TABLET | Refills: 1 | Status: SHIPPED | OUTPATIENT
Start: 2019-08-15 | End: 2019-11-24

## 2019-08-15 RX ORDER — METRONIDAZOLE 500 MG/1
500 TABLET ORAL 2 TIMES DAILY
Qty: 14 TABLET | Refills: 0 | Status: SHIPPED | OUTPATIENT
Start: 2019-08-15 | End: 2019-09-10

## 2019-08-15 ASSESSMENT — ANXIETY QUESTIONNAIRES
2. NOT BEING ABLE TO STOP OR CONTROL WORRYING: NOT AT ALL
1. FEELING NERVOUS, ANXIOUS, OR ON EDGE: NEARLY EVERY DAY
6. BECOMING EASILY ANNOYED OR IRRITABLE: NEARLY EVERY DAY
IF YOU CHECKED OFF ANY PROBLEMS ON THIS QUESTIONNAIRE, HOW DIFFICULT HAVE THESE PROBLEMS MADE IT FOR YOU TO DO YOUR WORK, TAKE CARE OF THINGS AT HOME, OR GET ALONG WITH OTHER PEOPLE: VERY DIFFICULT
GAD7 TOTAL SCORE: 6
5. BEING SO RESTLESS THAT IT IS HARD TO SIT STILL: NOT AT ALL
7. FEELING AFRAID AS IF SOMETHING AWFUL MIGHT HAPPEN: NOT AT ALL
3. WORRYING TOO MUCH ABOUT DIFFERENT THINGS: NOT AT ALL

## 2019-08-15 ASSESSMENT — PATIENT HEALTH QUESTIONNAIRE - PHQ9
5. POOR APPETITE OR OVEREATING: NOT AT ALL
SUM OF ALL RESPONSES TO PHQ QUESTIONS 1-9: 10

## 2019-08-15 ASSESSMENT — MIFFLIN-ST. JEOR: SCORE: 1375.76

## 2019-08-15 NOTE — PATIENT INSTRUCTIONS

## 2019-08-15 NOTE — PROGRESS NOTES
Subjective     Ledy Oconnor is a 39 year old female who presents to clinic today for the following health issues:    HPI   Concern - chronic Fatigue   Onset: chronic fatigue 3 years now    Description:   exhausted and tired all the time.  recommended blood work.    Progression of Symptoms:  worsening    Accompanying Signs & Symptoms:  Some easy bruising, feet feel hot like they are on fire     Previous history of similar problem:   no    Precipitating factors:   Worsened by:     Alleviating factors:  Improved by: super B was great for a long time, not cutting it no more.    Therapies Tried and outcome: supplements(6000IU of vitamin D a year ago, super B vitamin for 5 years), exercise, good amount of sleep, diet, has a  at work.    Vaginal Bleeding (Dysmenorrhea)  Onset: 1 months    Description:   Duration of bleeding episodes: entire month, old blood looks brown   Frequency between periods:    Describe bleeding/flow:   Clots: no,  Lots of mucus  Number of pads/hour: 1 pad per day  Cramping: none    Accompanying Signs & Symptoms:  Weakness: YES  Lightheadedness: YES  Hot flashes: no   Nosebleeds/Easy bruising: YES- easy bruising has one on her leg  Vaginal Discharge: no     History:  No LMP recorded. (Menstrual status: IUD).  Previous normal periods: not since she been on the IUD  Contraceptive use: IUD   Possibility of Pregnancy: no   Any bleeding after intercourse: no   Age of first period (menarche): 10  Abnormal PAP Smears: no     Patient has had heavy, irregular periods since around 18 years old.  Had an IUD placed in October 2017 after delivery of her daughter.  She's been having brown spotting every day now for the past month.  She also has history of US with ovarian cysts, cystic acne, and excess facial/chest hair.  She does not have a diagnosis of PCOS on her chart, but symptoms are consistent.  She has never been treated for PCOS before.         Patient Active Problem List  "  Diagnosis     Cluster headache, chronic     Fibroid uterus     PCOS (polycystic ovarian syndrome)     Past Surgical History:   Procedure Laterality Date     NO HISTORY OF SURGERY         Social History     Tobacco Use     Smoking status: Former Smoker     Smokeless tobacco: Never Used   Substance Use Topics     Alcohol use: Yes     Alcohol/week: 0.0 oz     Comment: seldom     Family History   Problem Relation Age of Onset     Migraines Mother         unknown age     Migraines Brother 30           Reviewed and updated as needed this visit by Provider         Review of Systems   ROS COMP: Constitutional, HEENT, cardiovascular, pulmonary, GI, , musculoskeletal, neuro, skin, endocrine and psych systems are negative, except as otherwise noted.      Objective    /80 (BP Location: Right arm, Patient Position: Sitting, Cuff Size: Adult Regular)   Temp 98.3  F (36.8  C) (Oral)   Ht 1.575 m (5' 2\")   Wt 75.8 kg (167 lb)   BMI 30.54 kg/m    Body mass index is 30.54 kg/m .  Physical Exam   GENERAL: healthy, alert and no distress  EYES: Eyes grossly normal to inspection  HENT: ear canals and TM's normal, nose and mouth without ulcers or lesions  NECK: no adenopathy, no asymmetry, masses, or scars and thyroid normal to palpation  RESP: lungs clear to auscultation - no rales, rhonchi or wheezes  CV: regular rates and rhythm, normal S1 S2, no S3 or S4, no murmur, click or rub and no peripheral edema  ABDOMEN: soft, nontender, no hepatosplenomegaly, no masses and bowel sounds normal  GYN: Normal external genitalia.  Vagina and cervix normal, but with some thick/white discharge.  IUD strings visible from cervical os   MS: no gross musculoskeletal defects noted, no edema  SKIN: Acne noted on face   PSYCH: mentation appears normal, affect normal/bright, tearful at times     Results for orders placed or performed in visit on 08/15/19   CBC with platelets differential   Result Value Ref Range    WBC 10.7 4.0 - 11.0 10e9/L "    RBC Count 4.19 3.8 - 5.2 10e12/L    Hemoglobin 13.6 11.7 - 15.7 g/dL    Hematocrit 40.6 35.0 - 47.0 %    MCV 97 78 - 100 fl    MCH 32.5 26.5 - 33.0 pg    MCHC 33.5 31.5 - 36.5 g/dL    RDW 12.7 10.0 - 15.0 %    Platelet Count 314 150 - 450 10e9/L    % Neutrophils 59.8 %    % Lymphocytes 31.5 %    % Monocytes 6.2 %    % Eosinophils 2.4 %    % Basophils 0.1 %    Absolute Neutrophil 6.4 1.6 - 8.3 10e9/L    Absolute Lymphocytes 3.4 0.8 - 5.3 10e9/L    Absolute Monocytes 0.7 0.0 - 1.3 10e9/L    Absolute Eosinophils 0.3 0.0 - 0.7 10e9/L    Absolute Basophils 0.0 0.0 - 0.2 10e9/L    Diff Method Automated Method    Hemoglobin A1c   Result Value Ref Range    Hemoglobin A1C 5.4 0 - 5.6 %   Wet prep   Result Value Ref Range    Specimen Description Vagina     Wet Prep Moderate  WBC'S seen       Wet Prep Few  Clue cells seen   (A)     Wet Prep No Trichomonas seen     Wet Prep No yeast seen              Assessment & Plan       ICD-10-CM    1. Fatigue, unspecified type R53.83 CBC with platelets differential     Comprehensive metabolic panel     Vitamin B12     Vitamin D Deficiency     TSH with free T4 reflex     Iron and iron binding capacity     Ferritin     Hemoglobin A1c   2. Intermenstrual spotting due to intrauterine device (IUD), subsequent encounter T83.83XD     N92.3    3. BV (bacterial vaginosis) N76.0 Wet prep    B96.89 metroNIDAZOLE (FLAGYL) 500 MG tablet   4. PCOS (polycystic ovarian syndrome) E28.2 spironolactone (ALDACTONE) 25 MG tablet     Check labs to look for causes of fatigue.  Will call with any abnormal results.      Start Aldactone for PCOS symptoms      Return in about 3 months (around 11/15/2019).  May need earlier follow up pending lab results.     Abeba Greene,   [facility]

## 2019-08-16 LAB
ALBUMIN SERPL-MCNC: 4.3 G/DL (ref 3.4–5)
ALP SERPL-CCNC: 57 U/L (ref 40–150)
ALT SERPL W P-5'-P-CCNC: 18 U/L (ref 0–50)
ANION GAP SERPL CALCULATED.3IONS-SCNC: 9 MMOL/L (ref 3–14)
AST SERPL W P-5'-P-CCNC: 11 U/L (ref 0–45)
BILIRUB SERPL-MCNC: 0.4 MG/DL (ref 0.2–1.3)
BUN SERPL-MCNC: 15 MG/DL (ref 7–30)
CALCIUM SERPL-MCNC: 9.9 MG/DL (ref 8.5–10.1)
CHLORIDE SERPL-SCNC: 106 MMOL/L (ref 94–109)
CO2 SERPL-SCNC: 25 MMOL/L (ref 20–32)
CREAT SERPL-MCNC: 0.76 MG/DL (ref 0.52–1.04)
DEPRECATED CALCIDIOL+CALCIFEROL SERPL-MC: 63 UG/L (ref 20–75)
FERRITIN SERPL-MCNC: 35 NG/ML (ref 12–150)
GFR SERPL CREATININE-BSD FRML MDRD: >90 ML/MIN/{1.73_M2}
GLUCOSE SERPL-MCNC: 93 MG/DL (ref 70–99)
IRON SATN MFR SERPL: 14 % (ref 15–46)
IRON SERPL-MCNC: 60 UG/DL (ref 35–180)
POTASSIUM SERPL-SCNC: 4.1 MMOL/L (ref 3.4–5.3)
PROT SERPL-MCNC: 7.9 G/DL (ref 6.8–8.8)
SODIUM SERPL-SCNC: 140 MMOL/L (ref 133–144)
TIBC SERPL-MCNC: 415 UG/DL (ref 240–430)
TSH SERPL DL<=0.005 MIU/L-ACNC: 1.9 MU/L (ref 0.4–4)
VIT B12 SERPL-MCNC: 713 PG/ML (ref 193–986)

## 2019-08-16 ASSESSMENT — ANXIETY QUESTIONNAIRES: GAD7 TOTAL SCORE: 6

## 2019-09-10 ENCOUNTER — OFFICE VISIT (OUTPATIENT)
Dept: FAMILY MEDICINE | Facility: CLINIC | Age: 39
End: 2019-09-10
Payer: COMMERCIAL

## 2019-09-10 VITALS
WEIGHT: 169 LBS | DIASTOLIC BLOOD PRESSURE: 78 MMHG | BODY MASS INDEX: 30.91 KG/M2 | SYSTOLIC BLOOD PRESSURE: 112 MMHG | HEART RATE: 87 BPM | TEMPERATURE: 98.5 F

## 2019-09-10 DIAGNOSIS — K59.00 CONSTIPATION, UNSPECIFIED CONSTIPATION TYPE: Primary | ICD-10-CM

## 2019-09-10 PROCEDURE — 99214 OFFICE O/P EST MOD 30 MIN: CPT | Performed by: PHYSICIAN ASSISTANT

## 2019-09-10 RX ORDER — MULTIPLE VITAMINS W/ MINERALS TAB 9MG-400MCG
1 TAB ORAL DAILY
COMMUNITY

## 2019-09-10 NOTE — PROGRESS NOTES
Subjective     Ledy Oconnor is a 39 year old female who presents to clinic today for the following health issues:    HPI   ABDOMINAL- low L side abdominal pain since last night    PAIN     Onset:     Description:   Character: constant ache and sharp pain last night -woke her up   Got pressure in rectum and would shoot pain to left lower side   Location: left lower quadrant  Didn't pass gas  Constant pressure in lower abdomen   Movement makes it worse   Radiation: Back lower     Intensity: 4/10    Progression of Symptoms:  worsening    Accompanying Signs & Symptoms:  Fever/Chills?: YES- yesterday 99.3   Gas/Bloating: YES- more pressure   Nausea: no   Vomitting: no   Diarrhea?: YES -very mild for 5-6 days   Constipation:no   Dysuria or Hematuria: no    History:   Trauma: no   Previous similar pain: no    Previous tests done: none    Precipitating factors:   Does the pain change with:     Food: no      BM: no     Urination: no     Alleviating factors:  None     Therapies Tried and outcome: none     LMP:  IUD    NORMAL BM  Was a week ago.    No hx of constipation.    No change in medications or travel.  Was feeling uncomfortable.  Feels bloated.    Was having blood in stool for about 2 weeks.  No hx of colon problems or family hx of colon cancer.  Mom and brother have diverticulitis.          Patient Active Problem List   Diagnosis     Cluster headache, chronic     Fibroid uterus     PCOS (polycystic ovarian syndrome)     Past Surgical History:   Procedure Laterality Date     NO HISTORY OF SURGERY         Social History     Tobacco Use     Smoking status: Former Smoker     Smokeless tobacco: Never Used   Substance Use Topics     Alcohol use: Yes     Alcohol/week: 0.0 oz     Comment: seldom     Family History   Problem Relation Age of Onset     Migraines Mother         unknown age     Migraines Brother 30             Reviewed and updated as needed this visit by Provider         Review of Systems   As above     "  Objective    /78   Pulse 87   Temp 98.5  F (36.9  C) (Oral)   Wt 76.7 kg (169 lb)   BMI 30.91 kg/m    Body mass index is 30.91 kg/m .  Physical Exam   Constitutional: She appears well-developed and well-nourished. No distress.   Cardiovascular: Normal rate, regular rhythm and normal heart sounds.   Pulmonary/Chest: Effort normal and breath sounds normal.   Abdominal: Soft. Bowel sounds are normal. She exhibits distension. She exhibits no mass. There is tenderness (all across lower abdomen, worse in llq ). There is no rebound.                  Assessment & Plan     1. Constipation, unspecified constipation type  Most likely cause of pain.  If not improving or worsening low threshold for CT. Did discuss may need colonoscopy in future with blood from rectum.    - magnesium citrate solution; Take 296 mLs by mouth once for 1 dose  Dispense: 296 mL; Refill: 0     BMI:   Estimated body mass index is 30.91 kg/m  as calculated from the following:    Height as of 8/15/19: 1.575 m (5' 2\").    Weight as of this encounter: 76.7 kg (169 lb).               No follow-ups on file.    Li Ruiz PA-C  [facility]      "

## 2019-11-07 ENCOUNTER — HEALTH MAINTENANCE LETTER (OUTPATIENT)
Age: 39
End: 2019-11-07

## 2019-11-24 ENCOUNTER — MYC REFILL (OUTPATIENT)
Dept: FAMILY MEDICINE | Facility: CLINIC | Age: 39
End: 2019-11-24

## 2019-11-24 DIAGNOSIS — E28.2 PCOS (POLYCYSTIC OVARIAN SYNDROME): ICD-10-CM

## 2019-11-26 RX ORDER — SPIRONOLACTONE 25 MG/1
25 TABLET ORAL DAILY
Qty: 90 TABLET | Refills: 1 | Status: SHIPPED | OUTPATIENT
Start: 2019-11-26 | End: 2020-09-16

## 2019-11-26 NOTE — TELEPHONE ENCOUNTER
Prescription approved per [facility] Refill Protocol.    Minnie Perez RN  [facility]

## 2019-11-26 NOTE — TELEPHONE ENCOUNTER
"Requested Prescriptions   Pending Prescriptions Disp Refills     spironolactone (ALDACTONE) 25 MG tablet  Last Written Prescription Date:  8/15/2019  Last Fill Quantity: 90 tabs,  # refills: 1   Last office visit: 9/10/2019 with prescribing provider:  Jc   Future Office Visit:   90 tablet 1     Sig: Take 1 tablet (25 mg) by mouth daily       Diuretics (Including Combos) Protocol Passed - 11/26/2019  7:05 AM        Passed - Blood pressure under 140/90 in past 12 months     BP Readings from Last 3 Encounters:   09/10/19 112/78   08/15/19 122/80   03/25/19 (!) 137/95                 Passed - Recent (12 mo) or future (30 days) visit within the authorizing provider's specialty     Patient has had an office visit with the authorizing provider or a provider within the authorizing providers department within the previous 12 mos or has a future within next 30 days. See \"Patient Info\" tab in inbasket, or \"Choose Columns\" in Meds & Orders section of the refill encounter.              Passed - Medication is active on med list        Passed - Patient is age 18 or older        Passed - No active pregancy on record        Passed - Normal serum creatinine on file in past 12 months     Recent Labs   Lab Test 08/15/19  1823   CR 0.76              Passed - Normal serum potassium on file in past 12 months     Recent Labs   Lab Test 08/15/19  1823   POTASSIUM 4.1                    Passed - Normal serum sodium on file in past 12 months     Recent Labs   Lab Test 08/15/19  1823                 Passed - No positive pregnancy test in past 12 months         "

## 2020-02-17 ENCOUNTER — HEALTH MAINTENANCE LETTER (OUTPATIENT)
Age: 40
End: 2020-02-17

## 2020-09-14 ENCOUNTER — MYC MEDICAL ADVICE (OUTPATIENT)
Dept: FAMILY MEDICINE | Facility: CLINIC | Age: 40
End: 2020-09-14

## 2020-09-14 NOTE — TELEPHONE ENCOUNTER
RN replied to patient via Mychart. See message for details.     Soraya Newton RN, BSN, PHN  [facility] [place]

## 2020-09-16 ENCOUNTER — OFFICE VISIT (OUTPATIENT)
Dept: OBGYN | Facility: OTHER | Age: 40
End: 2020-09-16
Payer: COMMERCIAL

## 2020-09-16 VITALS
DIASTOLIC BLOOD PRESSURE: 70 MMHG | WEIGHT: 178.25 LBS | BODY MASS INDEX: 32.6 KG/M2 | TEMPERATURE: 98.7 F | HEART RATE: 68 BPM | SYSTOLIC BLOOD PRESSURE: 132 MMHG

## 2020-09-16 DIAGNOSIS — E28.2 PCOS (POLYCYSTIC OVARIAN SYNDROME): ICD-10-CM

## 2020-09-16 DIAGNOSIS — Z97.5 IUD (INTRAUTERINE DEVICE) IN PLACE: ICD-10-CM

## 2020-09-16 DIAGNOSIS — L68.0 HIRSUTISM: ICD-10-CM

## 2020-09-16 DIAGNOSIS — N89.8 VAGINAL ODOR: Primary | ICD-10-CM

## 2020-09-16 DIAGNOSIS — D22.9 NEVUS: ICD-10-CM

## 2020-09-16 LAB
ANION GAP SERPL CALCULATED.3IONS-SCNC: 4 MMOL/L (ref 3–14)
BUN SERPL-MCNC: 14 MG/DL (ref 7–30)
CALCIUM SERPL-MCNC: 9.4 MG/DL (ref 8.5–10.1)
CHLORIDE SERPL-SCNC: 108 MMOL/L (ref 94–109)
CO2 SERPL-SCNC: 30 MMOL/L (ref 20–32)
CREAT SERPL-MCNC: 0.78 MG/DL (ref 0.52–1.04)
GFR SERPL CREATININE-BSD FRML MDRD: >90 ML/MIN/{1.73_M2}
GLUCOSE SERPL-MCNC: 121 MG/DL (ref 70–99)
POTASSIUM SERPL-SCNC: 4.4 MMOL/L (ref 3.4–5.3)
SODIUM SERPL-SCNC: 142 MMOL/L (ref 133–144)
SPECIMEN SOURCE: NORMAL
WET PREP SPEC: NORMAL

## 2020-09-16 PROCEDURE — 36415 COLL VENOUS BLD VENIPUNCTURE: CPT | Performed by: ADVANCED PRACTICE MIDWIFE

## 2020-09-16 PROCEDURE — 80048 BASIC METABOLIC PNL TOTAL CA: CPT | Performed by: ADVANCED PRACTICE MIDWIFE

## 2020-09-16 PROCEDURE — 87210 SMEAR WET MOUNT SALINE/INK: CPT | Performed by: ADVANCED PRACTICE MIDWIFE

## 2020-09-16 PROCEDURE — 99214 OFFICE O/P EST MOD 30 MIN: CPT | Performed by: ADVANCED PRACTICE MIDWIFE

## 2020-09-16 RX ORDER — AMOXICILLIN 500 MG
1200 CAPSULE ORAL DAILY
COMMUNITY

## 2020-09-16 RX ORDER — SPIRONOLACTONE 25 MG/1
25 TABLET ORAL 2 TIMES DAILY
Qty: 180 TABLET | Refills: 1 | Status: SHIPPED | OUTPATIENT
Start: 2020-09-16 | End: 2024-01-12

## 2020-09-16 NOTE — PATIENT INSTRUCTIONS
Boric acid capsules 600 mg purchased over the counter.  Insert one vaginally every night for 5 nights.

## 2020-09-16 NOTE — PROGRESS NOTES
Ledy Oconnor is a 40 year old who presents to the clinic for evaluation of sudden onset of severe bilateral pelvic pain this morning.   Had about 15 minutes of sharp pain that she rated 8/10 for about 15 min.  Felt like when an ovarian cyst had ruptured previously.   Pain gradually abated and is now a dull ache at 4.   Has a Mirena IUD.   Also has vaginal odor no itching. Some discharge.  Has had one episode of BV previously.   She would also like her spironolactone renewed.  It worked well for her acne but not so much for her hirsutism.       Histories reviewed and updated  Past Medical History:   Diagnosis Date     Headache      History of PCOS      Past Surgical History:   Procedure Laterality Date     NO HISTORY OF SURGERY       Social History     Socioeconomic History     Marital status:      Spouse name: Praveen     Number of children: 0     Years of education: 14     Highest education level: Not on file   Occupational History     Occupation: Printer   Social Needs     Financial resource strain: Not on file     Food insecurity     Worry: Not on file     Inability: Not on file     Transportation needs     Medical: Not on file     Non-medical: Not on file   Tobacco Use     Smoking status: Current Some Day Smoker     Types: Cigarettes     Smokeless tobacco: Never Used     Tobacco comment: social smoker    Substance and Sexual Activity     Alcohol use: Yes     Alcohol/week: 0.0 standard drinks     Comment: seldom     Drug use: No     Sexual activity: Yes     Partners: Male     Birth control/protection: I.U.D.   Lifestyle     Physical activity     Days per week: Not on file     Minutes per session: Not on file     Stress: Not on file   Relationships     Social connections     Talks on phone: Not on file     Gets together: Not on file     Attends [employer] service: Not on file     Active member of club or organization: Not on file     Attends meetings of clubs or organizations: Not on file     Relationship  status: Not on file     Intimate partner violence     Fear of current or ex partner: Not on file     Emotionally abused: Not on file     Physically abused: Not on file     Forced sexual activity: Not on file   Other Topics Concern     Parent/sibling w/ CABG, MI or angioplasty before 65F 55M? Not Asked   Social History Narrative     Not on file     Family History   Problem Relation Age of Onset     No Known Problems Daughter      Migraines Mother         unknown age     Cardiovascular Mother      Migraines Brother 30     Pulmonary fibrosis Father 52     No Known Problems Brother      No Known Problems Brother           ROS: 10 point ROS neg other than the symptoms noted above in the HPI.    EXAM:  /70 (BP Location: Right arm, Patient Position: Sitting, Cuff Size: Adult Large)   Pulse 68   Temp 98.7  F (37.1  C) (Oral)   Wt 80.9 kg (178 lb 4 oz)   BMI 32.60 kg/m    GENERAL:  Pleasant female, no acute distress  EYES: sclera clear  RESP: breathing unlabored  CV: extremities without edema  M/S: no gross deformities  Neuro:  normal strength and sensation  : PELVIC EXAM:  Vulva: No external lesions, normal hair distribution, no adenopathy, BUS WNL  Vagina: Moist, pink, no abnormal discharge, well rugated, no lesions  Cervix: smooth, pink, no visible lesions, neg CMT.  IUD strings at os  Uterus: Normal size, anteverted, non-tender, mobile  Ovaries: No mass, non-tender, mobile  Rectal exam: deferred  Psych:  alert, with normal speech and behavior  SKIN:  Has a 1 cm nevus on the anterior distal aspect of her right shin that she feels is changing.  Is bicolored  Wet prep negative for yeast, clue and trich but few WBCs    ASSESSMENT/PLAN:    (N89.8) Vaginal odor  (primary encounter diagnosis)  Comment:   Plan: Wet prep            (Z97.5) IUD (intrauterine device) in place  Comment:   Plan:     (E28.2) PCOS (polycystic ovarian syndrome)  Comment:   Plan: Basic metabolic panel  (Ca, Cl, CO2, Creat,         Gluc, K,  Na, BUN), spironolactone (ALDACTONE)         25 MG tablet            (L68.0) Hirsutism  Comment:   Plan: Basic metabolic panel  (Ca, Cl, CO2, Creat,         Gluc, K, Na, BUN)            (D22.9) Nevus  Comment:   Plan: DERMATOLOGY ADULT REFERRAL          No evidence of a vaginal infection based on exam and wet prep but she could consider boric acid capsules  Possible ovarian cyst.   Will return to clinic or seek follow up with continued or progressive pain.   Follow up with nevus with derm.   Will need K+ checked at 6 months to renew aldactone.       Visit length 30 minutes was spent face to face with the patient today discussing her history, diagnosis, and follow-up plan as noted above.  Over 50% of the visit was spent in counseling and coordination of care.    Time noted does not include time required to complete procedures.

## 2020-11-29 ENCOUNTER — HEALTH MAINTENANCE LETTER (OUTPATIENT)
Age: 40
End: 2020-11-29

## 2021-09-25 ENCOUNTER — HEALTH MAINTENANCE LETTER (OUTPATIENT)
Age: 41
End: 2021-09-25

## 2022-01-15 ENCOUNTER — HEALTH MAINTENANCE LETTER (OUTPATIENT)
Age: 42
End: 2022-01-15

## 2022-03-28 ENCOUNTER — OFFICE VISIT (OUTPATIENT)
Dept: FAMILY MEDICINE | Facility: CLINIC | Age: 42
End: 2022-03-28
Payer: COMMERCIAL

## 2022-03-28 VITALS
RESPIRATION RATE: 14 BRPM | TEMPERATURE: 99 F | HEART RATE: 78 BPM | SYSTOLIC BLOOD PRESSURE: 130 MMHG | WEIGHT: 165.25 LBS | DIASTOLIC BLOOD PRESSURE: 86 MMHG | OXYGEN SATURATION: 98 % | HEIGHT: 62 IN | BODY MASS INDEX: 30.41 KG/M2

## 2022-03-28 DIAGNOSIS — D22.9 CHANGE IN MOLE: Primary | ICD-10-CM

## 2022-03-28 PROCEDURE — 99213 OFFICE O/P EST LOW 20 MIN: CPT | Performed by: PHYSICIAN ASSISTANT

## 2022-03-28 ASSESSMENT — PAIN SCALES - GENERAL: PAINLEVEL: NO PAIN (0)

## 2022-03-28 ASSESSMENT — ENCOUNTER SYMPTOMS
LIGHT-HEADEDNESS: 0
DIAPHORESIS: 0
UNEXPECTED WEIGHT CHANGE: 0
FEVER: 0
CHILLS: 0
NERVOUS/ANXIOUS: 0
SHORTNESS OF BREATH: 0

## 2022-03-28 NOTE — PROGRESS NOTES
Assessment & Plan     Change in mole  Patient is a 41-year-old female who presents to clinic due to multiple changing moles.  The primary 3 of concern are at left aspect of forehead, right lower leg, and left cheek.  Patient has not been evaluated by dermatology in past.  Vital signs normal.  Approximately 0.25 cm circular, raised, flesh colored lesion at left aspect of forehead.  0.5 cm oval-shaped lesion with indentation at lateral aspect on left cheek.  Approximately 1 cm oval-shaped raised, brown lesion on right lower extremity with darkened flat ring around it.  Referral placed to dermatology for general skin check and evaluation of lesions of concern.  Return precautions provided.  - Adult Dermatology Referral; Future    See Patient Instructions    Return in about 4 weeks (around 4/25/2022), or if symptoms worsen or fail to improve.    Soheila Louie PA-C  [facility] KARIS Villafana is a 41 year old who presents for the following health issues     HPI     Patient notes multiple moles of concern. Patient had mole at right lower leg that has been present for entire life but is growing in size and has developed a dark ring around it.  Mole on left aspect of forehead that has been getting larger and changing in color over the past 3 months. She also has a slowly enlarging mole on left cheek that drains at times. No pain, itching or drainage. She is requesting referral to dermatology.  No personal history of skin cancer. Patient did receive referral to Derm in past, but did not follow-up.      Review of Systems   Constitutional: Negative for chills, diaphoresis, fever and unexpected weight change.   Respiratory: Negative for shortness of breath.    Cardiovascular: Negative for chest pain.   Skin: Negative for rash.   Neurological: Negative for light-headedness.   Psychiatric/Behavioral: The patient is not nervous/anxious.             Objective    /86   Pulse 78   Temp 99  F  "(37.2  C) (Tympanic)   Resp 14   Ht 1.575 m (5' 2\")   Wt 75 kg (165 lb 4 oz)   LMP  (LMP Unknown)   SpO2 98%   Breastfeeding No   BMI 30.22 kg/m    Body mass index is 30.22 kg/m .  Physical Exam  Vitals and nursing note reviewed.   Constitutional:       General: She is not in acute distress.     Appearance: Normal appearance.   HENT:      Head: Normocephalic and atraumatic.   Eyes:      Extraocular Movements: Extraocular movements intact.      Pupils: Pupils are equal, round, and reactive to light.   Cardiovascular:      Rate and Rhythm: Normal rate and regular rhythm.      Heart sounds: Normal heart sounds.   Pulmonary:      Effort: Pulmonary effort is normal.      Breath sounds: Normal breath sounds.   Musculoskeletal:         General: Normal range of motion.      Cervical back: Normal range of motion.   Skin:     General: Skin is warm and dry.      Comments:  Approximately 0.25 cm circular, raised, flesh colored lesion at left aspect of forehead.  0.5 cm oval-shaped lesion with indentation at lateral aspect on left cheek.  Approximately 1 cm oval-shaped raised, brown lesion on right lower extremity with darkened flat ring around it   Neurological:      General: No focal deficit present.      Mental Status: She is alert.   Psychiatric:         Mood and Affect: Mood normal.         Behavior: Behavior normal.                      "

## 2022-03-28 NOTE — PATIENT INSTRUCTIONS
For further evaluation of your moles, I have placed a referral to dermatology.  I would like you to follow-up within the next 2-3 weeks.  The scheduling team will be reaching out to you to arrange your appointment.  Please reach out questions or concerns.  Return to clinic for new or worsening symptoms.

## 2022-06-23 ENCOUNTER — OFFICE VISIT (OUTPATIENT)
Dept: DERMATOLOGY | Facility: CLINIC | Age: 42
End: 2022-06-23
Attending: PHYSICIAN ASSISTANT
Payer: COMMERCIAL

## 2022-06-23 DIAGNOSIS — D48.9 NEOPLASM OF UNCERTAIN BEHAVIOR: ICD-10-CM

## 2022-06-23 DIAGNOSIS — D22.9 MULTIPLE BENIGN NEVI: ICD-10-CM

## 2022-06-23 DIAGNOSIS — L82.1 SEBORRHEIC KERATOSES: ICD-10-CM

## 2022-06-23 DIAGNOSIS — L70.0 ACNE VULGARIS: Primary | ICD-10-CM

## 2022-06-23 DIAGNOSIS — Z12.83 ENCOUNTER FOR SCREENING FOR MALIGNANT NEOPLASM OF SKIN: ICD-10-CM

## 2022-06-23 DIAGNOSIS — L81.4 LENTIGINES: ICD-10-CM

## 2022-06-23 DIAGNOSIS — D18.01 CHERRY ANGIOMA: ICD-10-CM

## 2022-06-23 PROCEDURE — 99204 OFFICE O/P NEW MOD 45 MIN: CPT | Mod: 25 | Performed by: NURSE PRACTITIONER

## 2022-06-23 PROCEDURE — 88305 TISSUE EXAM BY PATHOLOGIST: CPT | Performed by: DERMATOLOGY

## 2022-06-23 PROCEDURE — 11102 TANGNTL BX SKIN SINGLE LES: CPT | Performed by: NURSE PRACTITIONER

## 2022-06-23 RX ORDER — SPIRONOLACTONE 50 MG/1
TABLET, FILM COATED ORAL
Qty: 180 TABLET | Refills: 1 | Status: SHIPPED | OUTPATIENT
Start: 2022-06-23 | End: 2024-01-12

## 2022-06-23 RX ORDER — TRETINOIN 0.25 MG/G
CREAM TOPICAL
Qty: 45 G | Refills: 11 | Status: SHIPPED | OUTPATIENT
Start: 2022-06-23 | End: 2024-05-16

## 2022-06-23 ASSESSMENT — PAIN SCALES - GENERAL: PAINLEVEL: NO PAIN (0)

## 2022-06-23 NOTE — PROGRESS NOTES
[facility] Dermatology Note  Encounter Date: Jun 23, 2022  Office Visit     Reviewed patients past medical history and pertinent chart review prior to patients visit today.     Dermatology Problem List:  NUB chin biopsy 06/23/22  Acne, started spironolactone 100 mg daily and tretinoin 0.025% cream 06/23/22     ____________________________________________    Assessment & Plan:     # Neoplasm of uncertain behavior:  chin  DDx includes irritated nevus. Shave biopsy today.    Procedure Note: Biopsy by shave technique  The risks and benefits of the procedure were described to the patient. These include but are not limited to bleeding, infection, scar, incomplete removal, and non-diagnostic biopsy. Verbal informed consent was obtained. The above site(s) was cleansed with an alcohol pad and injected with 1% lidocaine with epinephrine. Once anesthesia was obtained, a biopsy(ies) was performed with Gilette blade. The tissue(s) was placed in a labeled container(s) with formalin and sent to pathology. Hemostasis was achieved with aluminum chloride. Vaseline and a bandage were applied to the wound(s). The patient tolerated the procedure well and was given post biopsy care instructions.     # Acne vulgaris    -Recommend benzoyl peroxide 10% wash to all areas of acne. Patient counseled medication can bleach towels and clothing.  -Start tretinoin 0.025% cream. Apply once every other day and increase to nightly as tolerate.  Waiting a few minutes after washing affected areas will decrease irritation. Method of application, side effects and expected results were discussed. The patient will apply pea size amount to the entire face, avoid areas around the eyes, corners of nose and mouth. Discussed side effects including photosensitivity and irritation.   -moisturize with a cream such as Cetaphil cream, Cera Ve Cream or Vanicream nightly and more PRN for dryness.    -Start spironolactone 50 mg daily for 1 week and  if tolerating it well without side effects can increase to 100 mg daily. This is a medication used in high doses for blood pressure but in lower doses for acne due to its anti-male hormone effects. Main side effects are dizziness, menstrual spotting, breast tenderness, high potassium, and can be harmful to a fetus if you were to become pregnant. It is very important to avoid pregnancy while on this medication and to stop it immediately if you do become pregnant. Advised to stay well hydrated while on the medication and notify us if she were to feel heart palpitations. She is a young and healthy individual so we are not checking potassium levels regularly unless side effects occur.      # Benign skin findings including: seborrheic keratoses, cherry angioma, lentigines and benign nevi.   - No further intervention required. Patient to report changes.   - Patient reassured of the benign nature of these lesions.    #Dilated pore with underlying cyst versus nevus, forehead.  Appears benign.    #Signs and Symptoms of non-melanoma skin cancer and ABCDEs of melanoma reviewed with patient. Patient encouraged to perform monthly self skin exams and educated on how to perform them. UV precautions reviewed with patient. Patient was asked about new or changing moles/lesions on body.     #Reviewed Sunscreen: Apply 20 minutes prior to going outdoors and reapply every two hours, when wet or sweating. We recommend using an SPF 30 or higher, and to use one that is water resistant.       Follow-up: 3 months for recheck of acne, 1 years for follow up full body skin exam, prn for new or changing lesions or new concerns    Yadira Guallpa, SHAVONNE CNP on 6/23/2022 at 2:04 PM    ____________________________________________    CC: Skin Check    HPI:  Ms. Ledy Oconnor is a(n) 41 year old female who presents today as a new patient for a full body skin cancer screening. Patient has concerns today about acne on her face and back that she has  had for several years.  She has tried many over-the-counter products without success.  She is very self-conscious and does not let anyone but her  or her mother see her back.  She also has a mole on her chin that she thinks is growing and changing.  She has had this for several years.  It does grow hairs that she plucks.  She is very self-conscious about a lot of her moles and concerned about them.  She has a larger more on her right lower leg that she has had all of her life.  She says most providers want her to get this checked by dermatology but she has put this off for many years..     Patient is otherwise feeling well, without additional skin concerns.     Physical Exam:  Vitals: There were no vitals taken for this visit.  SKIN: Total skin excluding the genitalia areas was performed. The exam included the head/face, neck, both arms, chest, back, abdomen, both legs, digits, mons pubis, buttock and nails.   -Chin, 5 mm skin colored dome-shaped papule with terminal hairs  -upper back and jawline has some inflammatory papules, and back has extensive small scarring  -right anterior lower leg, 1.4 x 1 cm darker brown slightly raised homogenous papule  -several 1-2mm red dome shaped symmetric papules scattered on the trunk  - 100+ tan/brown flat round macules and raised papules scattered throughout trunk, extremities and head. No worrisome features for malignancy noted on examination.  -scattered tan, homogenous macules scattered on sun exposed areas of trunk, extremities and face.   -A few scattered waxy, stuck on tan/brown papules and patches on the trunk   -Forehead, dilated pore with skin contents within and underlying skin colored slightly firm dome-shaped papule    - No other lesions of concern on areas examined.     Medications:  Current Outpatient Medications   Medication     spironolactone (ALDACTONE) 50 MG tablet     tretinoin (RETIN-A) 0.025 % external cream     levonorgestrel (MIRENA) 20 MCG/24HR  IUD     multivitamin w/minerals (THERA-VIT-M) tablet     Omega-3 Fatty Acids (FISH OIL) 1200 MG capsule     spironolactone (ALDACTONE) 25 MG tablet     No current facility-administered medications for this visit.     Facility-Administered Medications Ordered in Other Visits   Medication     sodium chloride (PF) 0.9% PF flush 3 mL      Past Medical History:   Patient Active Problem List   Diagnosis     Cluster headache, chronic     Fibroid uterus     PCOS (polycystic ovarian syndrome)     IUD (intrauterine device) in place     Past Medical History:   Diagnosis Date     Headache      History of PCOS            CC Soheila Louie PA-C  [facility]  [id number] [place] [address] on close of this encounter.

## 2022-06-23 NOTE — PATIENT INSTRUCTIONS
"Acne vulgaris    Morning regimen:    *Wash with either a gentle cleanser such as Cetaphil or Cera Ve unmedicated gentle cleanser. If at any point you are not experiencing dryness or peeling and want more improvement, try adding in a Benzoyl peroxide wash 5%-10%. All of these washes can be purchased over the counter at [employer], [employer], [employer] etc. If you have acne on your chest/back a benzoyl peroxide 10% wash can give you some added benefit if you use it in the shower to the affected areas, and let it sit on the skin for 3-5 minutes before rinsing it off. Benzoyl peroxide can cause dryness so if you're experiencing too much dryness stop the benzoyl peroxide wash and use just a gentle cleanser until dryness subsides. Benzoyl peroxide can also bleach fabrics so you may want to use white towels to avoid bleaching your towels.     *Apply a gentle moisturizer with SPF 30+ that says either \"noncomedogenic\" or \"oil free\" meaning it won't clog pores.     Evening regimen:    *Wash with either a gentle cleanser such as Cetaphil gentle cleanser or Benzoyl peroxide wash 5%. (See morning instructions for choosing a wash)    *Allow skin to fully dry again before applying medications. Applying retinods to moist skin will cause added dryness.     *Apply prescription retinoid (tretinoin, adapalene or Differin. Whichever you were prescribed) A pea sized amount will be enough for the entire face. More is not better, it will just add to the dryness. Apply to chest and back areas as well if affected. This is not a spot treatment so make sure you're covering the entire area that is affected by acne. When you're first starting a retinoid you WILL be dry and may have some flaking of the skin. This is an expected response so keep using the medication. If the dryness is not controlled with moisturizing often, you may want to decrease how often you use it temporarially and slowly increase the frequency to nightly use as the dryness " subsides. For most people I would recommend starting use every 3rd night for a few weeks, then increasing to every other night for a few weeks then when you feel like you can tolerate it go up to nightly use. This should help the dryness.     *Apply a gentle moisturizing CREAM. Cera Ve cream, Cetaphil Cream, or Vanicream are good options.         It may take 2-3 months to see 50-70% improvement. It is important to give the topical medications time to clean out your pores and prevent new acne from forming. Sometimes people flare with more acne after starting a new regimen and this is okay. If the flare is severe please call our office and speak with a nurse about your acne.  Otherwise, please continue use of this regimen for the next 3 months and come back for a reevaluation with me and we can adjust your plan at that time.        Spironolactone: Patient drug information     What is this drug used for?   It is used to get rid of extra fluid.   It is used to raise potassium in the body.   It is used to treat heart failure (weak heart).   It is used to treat high blood pressure.   It is used to treat some people with high aldosterone levels.   It is used to treat some kidney problems.   It may be given to you for other reasons. Talk with the doctor.    What do I need to tell my doctor BEFORE I take this drug?   If you have an allergy to spironolactone or any other part of this drug.   If you are allergic to this drug; any part of this drug; or any other drugs, foods, or substances. Tell your doctor about the allergy and what signs you had.   If you have any of these health problems: Harish's disease or high potassium levels.   If you are taking any of these drugs: Amiloride, eplerenone, or triamterene.  This is not a list of all drugs or health problems that interact with this drug.  Tell your doctor and pharmacist about all of your drugs (prescription or OTC, natural products, vitamins) and health problems. You must  check to make sure that it is safe for you to take this drug with all of your drugs and health problems. Do not start, stop, or change the dose of any drug without checking with your doctor.    What are some things I need to know or do while I take this drug?   Tell all of your health care providers that you take this drug. This includes your doctors, nurses, pharmacists, and dentists.   Avoid driving and doing other tasks or actions that call for you to be alert until you see how this drug affects you.   Check your blood pressure as you have been told.   Have blood work checked as you have been told by the doctor. Talk with the doctor.   This drug may affect certain lab tests. Tell all of your health care providers and lab workers that you take this drug.   If you are on a low-salt or salt-free diet, talk with your doctor.   Sometimes, this drug may raise potassium levels in the blood. This can be deadly if it is not treated. The risk is highest in people with diabetes, kidney disease, severe illness, and in older adults. Your doctor will follow you closely to change the dose if needed.   If you are taking a salt substitute that has potassium in it, a potassium-sparing diuretic, or a potassium product, talk with your doctor.   Talk with your doctor before you drink alcohol or use other drugs and natural products that slow your actions.   If you have high blood sugar (diabetes), you will need to watch your blood sugar closely.   Tell your doctor if you are pregnant or plan on getting pregnant. You will need to talk about the benefits and risks of using this drug while you are pregnant.   Tell your doctor if you are breast-feeding. You will need to talk about any risks to your baby.    What are some side effects that I need to call my doctor about right away?  WARNING/CAUTION: Even though it may be rare, some people may have very bad and sometimes deadly side effects when taking a drug. Tell your doctor or get  medical help right away if you have any of the following signs or symptoms that may be related to a very bad side effect:   Signs of an allergic reaction, like rash; hives; itching; red, swollen, blistered, or peeling skin with or without fever; wheezing; tightness in the chest or throat; trouble breathing, swallowing, or talking; unusual hoarseness; or swelling of the mouth, face, lips, tongue, or throat.   Signs of fluid and electrolyte problems like mood changes, confusion, muscle pain or weakness, a heartbeat that does not feel normal, very bad dizziness or passing out, fast heartbeat, more thirst, seizures, feeling very tired or weak, not hungry, unable to pass urine or change in the amount of urine produced, dry mouth, dry eyes, or very bad upset stomach or throwing up.   Signs of kidney problems like unable to pass urine, change in how much urine is passed, blood in the urine, or a big weight gain.   Signs of a very bad skin reaction (Xiao-Georges syndrome/toxic epidermal necrolysis) like red, swollen, blistered, or peeling skin (with or without fever); red or irritated eyes; or sores in the mouth, throat, nose, or eyes.   Very bad dizziness or passing out.   Feeling confused.   Change in balance.   Lowered interest in sex.   Not able to get or keep an erection.   Fever or chills.   Sore throat.   Any unexplained bruising or bleeding.   Black, tarry, or bloody stools.   Throwing up blood or throw up that looks like coffee grounds.   Period (menstrual) changes.   Breast pain.   For males, enlarged breasts.   Liver problems have rarely happened with this drug. Sometimes, this has been deadly. Call your doctor right away if you have signs of liver problems like dark urine, feeling tired, not hungry, upset stomach or stomach pain, light-colored stools, throwing up, or yellow skin or eyes.  What are some other side effects of this drug?  All drugs may cause side effects. However, many people have no side  effects or only have minor side effects. Call your doctor or get medical help if any of these side effects or any other side effects bother you or do not go away:   Diarrhea.   Feeling sleepy.   Dizziness.   Headache.   Upset stomach or throwing up.   Stomach cramps.   Hair loss.    These are not all of the side effects that may occur. If you have questions about side effects, call your doctor. Call your doctor for medical advice about side effects.  You may report side effects to your national health agency.  How is this drug best taken?  Use this drug as ordered by your doctor. Read all information given to you. Follow all instructions closely.  All products:   Take with or without food but take the same way each time. Always take with food or always take on an empty stomach.   Keep taking this drug as you have been told by your doctor or other health care provider, even if you feel well.   This drug may cause you to pass urine more often. To keep from having sleep problems, try not to take too close to bedtime.  Liquid (suspension):   Shake well before use.   Measure liquid doses carefully. Use the measuring device that comes with this drug. If there is none, ask the pharmacist for a device to measure this drug.    What do I do if I miss a dose?   Take a missed dose as soon as you think about it.   If it is close to the time for your next dose, skip the missed dose and go back to your normal time.   Do not take 2 doses at the same time or extra doses.  How do I store and/or throw out this drug?   Store at room temperature.   Store in a dry place. Do not store in a bathroom.   Keep all drugs in a safe place. Keep all drugs out of the reach of children and pets.   Throw away unused or  drugs. Do not flush down a toilet or pour down a drain unless you are told to do so. Check with your pharmacist if you have questions about the best way to throw out drugs. There may be drug take-back programs in your  area.  General drug facts   If your symptoms or health problems do not get better or if they become worse, call your doctor.   Do not share your drugs with others and do not take anyone else's drugs.   Some drugs may have another patient information leaflet. If you have any questions about this drug, please talk with your doctor, nurse, pharmacist, or other health care provider.   If you think there has been an overdose, call your poison control center or get medical care right away. Be ready to tell or show what was taken, how much, and when it happened.      Use of UpToDate is subject to the Subscription and License Agreement.  Topic 31459 Version 157.0  Close  The use of UpToDate is subject to theSubscription and License Agreement.  [employer] drug information & [employer] are subject to the [employer] License Agreement.           Wound Care Instructions     FOR SUPERFICIAL WOUNDS     [facility], [facility] or    [facility] [phone]          AFTER 24 HOURS YOU SHOULD REMOVE THE BANDAGE AND BEGIN DAILY DRESSING CHANGES AS FOLLOWS:     1) Remove Dressing.     2) Clean and dry the area with tap water using a Q-tip or sterile gauze pad.     3) Apply Vaseline, Aquaphor, Polysporin ointment or Bacitracin ointment over entire wound.  Do NOT use Neosporin ointment.     4) Cover the wound with a band-aid, or a sterile non-stick gauze pad and micropore paper tape      REPEAT THESE INSTRUCTIONS AT LEAST ONCE A DAY UNTIL THE WOUND HAS COMPLETELY HEALED.    It is an old wives tale that a wound heals better when it is exposed to air and allowed to dry out. The wound will heal faster with a better cosmetic result if it is kept moist with ointment and covered with a bandage.    **Do not let the wound dry out.**      Supplies Needed:      *Cotton tipped applicators (Q-tips)    *Polysporin Ointment or Bacitracin Ointment (NOT NEOSPORIN)    *Band-aids or non-stick gauze pads and micropore paper  tape.      PATIENT INFORMATION:    During the healing process you will notice a number of changes. All wounds develop a small halo of redness surrounding the wound.  This means healing is occurring. Severe itching with extensive redness usually indicates sensitivity to the ointment or bandage tape used to dress the wound.  You should call our office if this develops.      Swelling  and/or discoloration around your surgical site is common, particularly when performed around the eye.    All wounds normally drain.  The larger the wound the more drainage there will be.  After 7-10 days, you will notice the wound beginning to shrink and new skin will begin to grow.  The wound is healed when you can see skin has formed over the entire area.  A healed wound has a healthy, shiny look to the surface and is red to dark pink in color to normalize.  Wounds may take approximately 4-6 weeks to heal.  Larger wounds may take 6-8 weeks.  After the wound is healed you may discontinue dressing changes.    You may experience a sensation of tightness as your wound heals. This is normal and will gradually subside.    Your healed wound may be sensitive to temperature changes. This sensitivity improves with time, but if you re having a lot of discomfort, try to avoid temperature extremes.    Patients frequently experience itching after their wound appears to have healed because of the continue healing under the skin.  Plain Vaseline will help relieve the itching.        POSSIBLE COMPLICATIONS    BLEEDING:    Leave the bandage in place.  Use tightly rolled up gauze or a cloth to apply direct pressure over the bandage for 30  minutes.  Reapply pressure for an additional 30 minutes if necessary  Use additional gauze and tape to maintain pressure once the bleeding has stopped.

## 2022-06-23 NOTE — LETTER
6/23/2022         RE: Ledy Oconnor  [address]   [place] [address]        Dear Colleague,    Thank you for referring your patient, Ledy Oconnor, to the [facility]. Please see a copy of my visit note below.    [facility] Dermatology Note  Encounter Date: Jun 23, 2022  Office Visit     Reviewed patients past medical history and pertinent chart review prior to patients visit today.     Dermatology Problem List:  NUB chin biopsy 06/23/22  Acne, started spironolactone 100 mg daily and tretinoin 0.025% cream 06/23/22     ____________________________________________    Assessment & Plan:     # Neoplasm of uncertain behavior:  chin  DDx includes irritated nevus. Shave biopsy today.    Procedure Note: Biopsy by shave technique  The risks and benefits of the procedure were described to the patient. These include but are not limited to bleeding, infection, scar, incomplete removal, and non-diagnostic biopsy. Verbal informed consent was obtained. The above site(s) was cleansed with an alcohol pad and injected with 1% lidocaine with epinephrine. Once anesthesia was obtained, a biopsy(ies) was performed with Gilette blade. The tissue(s) was placed in a labeled container(s) with formalin and sent to pathology. Hemostasis was achieved with aluminum chloride. Vaseline and a bandage were applied to the wound(s). The patient tolerated the procedure well and was given post biopsy care instructions.     # Acne vulgaris    -Recommend benzoyl peroxide 10% wash to all areas of acne. Patient counseled medication can bleach towels and clothing.  -Start tretinoin 0.025% cream. Apply once every other day and increase to nightly as tolerate.  Waiting a few minutes after washing affected areas will decrease irritation. Method of application, side effects and expected results were discussed. The patient will apply pea size amount to the entire face, avoid areas around the eyes, corners  of nose and mouth. Discussed side effects including photosensitivity and irritation.   -moisturize with a cream such as Cetaphil cream, Cera Ve Cream or Vanicream nightly and more PRN for dryness.    -Start spironolactone 50 mg daily for 1 week and if tolerating it well without side effects can increase to 100 mg daily. This is a medication used in high doses for blood pressure but in lower doses for acne due to its anti-male hormone effects. Main side effects are dizziness, menstrual spotting, breast tenderness, high potassium, and can be harmful to a fetus if you were to become pregnant. It is very important to avoid pregnancy while on this medication and to stop it immediately if you do become pregnant. Advised to stay well hydrated while on the medication and notify us if she were to feel heart palpitations. She is a young and healthy individual so we are not checking potassium levels regularly unless side effects occur.      # Benign skin findings including: seborrheic keratoses, cherry angioma, lentigines and benign nevi.   - No further intervention required. Patient to report changes.   - Patient reassured of the benign nature of these lesions.    #Dilated pore with underlying cyst versus nevus, forehead.  Appears benign.    #Signs and Symptoms of non-melanoma skin cancer and ABCDEs of melanoma reviewed with patient. Patient encouraged to perform monthly self skin exams and educated on how to perform them. UV precautions reviewed with patient. Patient was asked about new or changing moles/lesions on body.     #Reviewed Sunscreen: Apply 20 minutes prior to going outdoors and reapply every two hours, when wet or sweating. We recommend using an SPF 30 or higher, and to use one that is water resistant.       Follow-up: 3 months for recheck of acne, 1 years for follow up full body skin exam, prn for new or changing lesions or new concerns    Yadira Guallpa, APRN CNP on 6/23/2022 at 2:04  PM    ____________________________________________    CC: Skin Check    HPI:  Ms. Ldey Oconnor is a(n) 41 year old female who presents today as a new patient for a full body skin cancer screening. Patient has concerns today about acne on her face and back that she has had for several years.  She has tried many over-the-counter products without success.  She is very self-conscious and does not let anyone but her  or her mother see her back.  She also has a mole on her chin that she thinks is growing and changing.  She has had this for several years.  It does grow hairs that she plucks.  She is very self-conscious about a lot of her moles and concerned about them.  She has a larger more on her right lower leg that she has had all of her life.  She says most providers want her to get this checked by dermatology but she has put this off for many years..     Patient is otherwise feeling well, without additional skin concerns.     Physical Exam:  Vitals: There were no vitals taken for this visit.  SKIN: Total skin excluding the genitalia areas was performed. The exam included the head/face, neck, both arms, chest, back, abdomen, both legs, digits, mons pubis, buttock and nails.   -Chin, 5 mm skin colored dome-shaped papule with terminal hairs  -upper back and jawline has some inflammatory papules, and back has extensive small scarring  -right anterior lower leg, 1.4 x 1 cm darker brown slightly raised homogenous papule  -several 1-2mm red dome shaped symmetric papules scattered on the trunk  - 100+ tan/brown flat round macules and raised papules scattered throughout trunk, extremities and head. No worrisome features for malignancy noted on examination.  -scattered tan, homogenous macules scattered on sun exposed areas of trunk, extremities and face.   -A few scattered waxy, stuck on tan/brown papules and patches on the trunk   -Forehead, dilated pore with skin contents within and underlying skin colored slightly  firm dome-shaped papule    - No other lesions of concern on areas examined.     Medications:  Current Outpatient Medications   Medication     spironolactone (ALDACTONE) 50 MG tablet     tretinoin (RETIN-A) 0.025 % external cream     levonorgestrel (MIRENA) 20 MCG/24HR IUD     multivitamin w/minerals (THERA-VIT-M) tablet     Omega-3 Fatty Acids (FISH OIL) 1200 MG capsule     spironolactone (ALDACTONE) 25 MG tablet     No current facility-administered medications for this visit.     Facility-Administered Medications Ordered in Other Visits   Medication     sodium chloride (PF) 0.9% PF flush 3 mL      Past Medical History:   Patient Active Problem List   Diagnosis     Cluster headache, chronic     Fibroid uterus     PCOS (polycystic ovarian syndrome)     IUD (intrauterine device) in place     Past Medical History:   Diagnosis Date     Headache      History of PCOS            CC Soheila Louie PA-C  [facility]  [address] [place] [address] on close of this encounter.      Again, thank you for allowing me to participate in the care of your patient.        Sincerely,        SHAVONNE Roman CNP

## 2022-06-27 LAB
PATH REPORT.COMMENTS IMP SPEC: NORMAL
PATH REPORT.COMMENTS IMP SPEC: NORMAL
PATH REPORT.FINAL DX SPEC: NORMAL
PATH REPORT.GROSS SPEC: NORMAL
PATH REPORT.MICROSCOPIC SPEC OTHER STN: NORMAL
PATH REPORT.RELEVANT HX SPEC: NORMAL

## 2022-06-28 ENCOUNTER — TELEPHONE (OUTPATIENT)
Dept: DERMATOLOGY | Facility: CLINIC | Age: 42
End: 2022-06-28

## 2022-06-28 NOTE — TELEPHONE ENCOUNTER
Prior Authorization Retail Medication Request    Medication/Dose: tretinoin (RETIN-A) 0.025 % external cream  ICD code (if different than what is on RX):  L70.0      Insurance Name:  [facility]  Insurance ID:  [id number]       Pharmacy Information (if different than what is on RX)  Name:  [facility]  Phone:  [phone]

## 2022-06-29 ENCOUNTER — TELEPHONE (OUTPATIENT)
Dept: FAMILY MEDICINE | Facility: CLINIC | Age: 42
End: 2022-06-29

## 2022-06-29 NOTE — TELEPHONE ENCOUNTER
Central Prior Authorization Team   Phone: [phone]      PA Initiation    Medication: tretinoin (RETIN-A) 0.025 % external cream--INITIATED  Insurance Company: [employer] ([facility]) - Phone [phone] Fax [phone]  Pharmacy Filling the Rx: [id number] [place] [address] [address] [place] [address]  Filling Pharmacy Phone: [phone]  Filling Pharmacy Fax:    Start Date: 6/29/2022

## 2022-06-29 NOTE — TELEPHONE ENCOUNTER
----- Message from SHAVONNE Sawyer CNP sent at 6/29/2022  7:42 AM CDT -----  [employer] message sent

## 2022-06-29 NOTE — LETTER
June 29, 2022      Ledy Oconnor  [address]   [place] [address]          Dear Ledy,     Your biopsy results showed a benign harmless mole, great news! Nothing further is needed for this spot. Continue the wound care until the biopsy site is fully healed. If you have any further questions or concerns please send me a message.       Sincerely,    SHAVONNE Roman CNP

## 2022-06-29 NOTE — TELEPHONE ENCOUNTER
Central Prior Authorization Team   Phone: [phone]      Prior Authorization Approval    Authorization Effective Date: 6/29/2022  Authorization Expiration Date: 6/29/2023  Medication: tretinoin (RETIN-A) 0.025 % external cream--APPROVED  Approved Dose/Quantity:    Reference #:     Insurance Company: [employer] ([facility]) - Phone [phone] Fax [phone]  Expected CoPay:       CoPay Card Available:      Foundation Assistance Needed:    Which Pharmacy is filling the prescription (Not needed for infusion/clinic administered): [id number] - [place] [address] AT [place] [address]  Pharmacy Notified: Yes  Patient Notified: Yes PHARMACY WILL CONTACT WHEN FILLED

## 2022-06-29 NOTE — TELEPHONE ENCOUNTER
Your biopsy results showed a benign harmless mole, great news! Nothing further is needed for this spot. Continue the wound care until the biopsy site is fully healed. If you have any further questions or concerns please send me a message.     Lab letter mailed to pt with Lainey's message above.    Jia BERNSTEIN RN  [facility] [place]  [phone]

## 2022-12-26 ENCOUNTER — HEALTH MAINTENANCE LETTER (OUTPATIENT)
Age: 42
End: 2022-12-26

## 2023-02-24 ENCOUNTER — OFFICE VISIT (OUTPATIENT)
Dept: FAMILY MEDICINE | Facility: CLINIC | Age: 43
End: 2023-02-24
Payer: COMMERCIAL

## 2023-02-24 VITALS
RESPIRATION RATE: 18 BRPM | BODY MASS INDEX: 29.63 KG/M2 | WEIGHT: 161 LBS | SYSTOLIC BLOOD PRESSURE: 131 MMHG | TEMPERATURE: 99.4 F | OXYGEN SATURATION: 99 % | HEIGHT: 62 IN | HEART RATE: 97 BPM | DIASTOLIC BLOOD PRESSURE: 89 MMHG

## 2023-02-24 DIAGNOSIS — J03.90 TONSILLITIS: Primary | ICD-10-CM

## 2023-02-24 DIAGNOSIS — R07.0 THROAT PAIN: ICD-10-CM

## 2023-02-24 LAB
DEPRECATED S PYO AG THROAT QL EIA: NEGATIVE
GROUP A STREP BY PCR: NOT DETECTED

## 2023-02-24 PROCEDURE — 87651 STREP A DNA AMP PROBE: CPT | Performed by: PHYSICIAN ASSISTANT

## 2023-02-24 PROCEDURE — 99213 OFFICE O/P EST LOW 20 MIN: CPT | Performed by: PHYSICIAN ASSISTANT

## 2023-02-24 RX ORDER — PREDNISONE 20 MG/1
40 TABLET ORAL DAILY
Qty: 10 TABLET | Refills: 0 | Status: SHIPPED | OUTPATIENT
Start: 2023-02-24 | End: 2023-03-01

## 2023-02-24 RX ORDER — HYDROCODONE BITARTRATE AND ACETAMINOPHEN 5; 325 MG/1; MG/1
1 TABLET ORAL 3 TIMES DAILY PRN
Qty: 9 TABLET | Refills: 0 | Status: SHIPPED | OUTPATIENT
Start: 2023-02-24 | End: 2023-02-27

## 2023-02-24 ASSESSMENT — PAIN SCALES - GENERAL: PAINLEVEL: WORST PAIN (10)

## 2023-02-24 NOTE — RESULT ENCOUNTER NOTE
MsIlda Oconnor,    All of your labs were normal/near normal for you.    Please contact the clinic if you have additional questions.  Thank you.    Sincerely,    Kris Love PA-C

## 2023-02-24 NOTE — PROGRESS NOTES
Assessment & Plan       ICD-10-CM    1. Tonsillitis  J03.90 amoxicillin-clavulanate (AUGMENTIN) 875-125 MG tablet     predniSONE (DELTASONE) 20 MG tablet     HYDROcodone-acetaminophen (NORCO) 5-325 MG tablet      2. Throat pain  R07.0 Streptococcus A Rapid Screen w/Reflex to PCR - Clinic Collect      Talk to patient about her concerns.  Her pain is out of proportion to her exam.  I am concerned that there might be starts of a tonsillar abscess.  We will put her on Augmentin and prednisone and Norco.  Because it is the weekend I discussed kaylynn with her that if her symptoms worsen she needs to be seen in the emergency room.  Warning signs discussed.  side effects discussed  Symptomatic treatment: such as fluids,  OTC acetaminophen and /or non-steroidal anti-inflammatory medication.    Return in about 2 days (around 2/26/2023) for recheck, As Needed.    [facility]    Lakshmi Villafana is a 42 year old accompanied by her self, presenting for the following health issues:  Throat Pain      History of Present Illness       Reason for visit:  Tonsil pain,fever  Symptom onset:  1-3 days ago  Symptoms include:  Tonsil pain, fever  Symptom intensity:  Severe  Symptom progression:  Worsening  Had these symptoms before:  Yes  Has tried/received treatment for these symptoms:  Yes  Previous treatment was successful:  Yes  Prior treatment description:  Antibiotic  What makes it worse:  Swallowing  What makes it better:  Sleeping    She eats 2-3 servings of fruits and vegetables daily.She consumes 1 sweetened beverage(s) daily.She exercises with enough effort to increase her heart rate 9 or less minutes per day.  She exercises with enough effort to increase her heart rate 3 or less days per week.   She is taking medications regularly.     3 days for sore throat. Pain with swallowing. 10/10 pain. Fevers. No wheezing or short of breath.   3rd time in 4  Yrs that this has happened.  "  Diagnosis with tonsilitis in the pasts.       Review of Systems   Constitutional, HEENT, cardiovascular, pulmonary, gi and gu systems are negative, except as otherwise noted.      Objective    /89   Pulse 97   Temp 99.4  F (37.4  C) (Tympanic)   Resp 18   Ht 1.575 m (5' 2\")   Wt 73 kg (161 lb)   SpO2 99%   BMI 29.45 kg/m    Body mass index is 29.45 kg/m .  Physical Exam   GENERAL: healthy, alert and no distress  Head: Normocephalic, atraumatic.  Eyes: Conjunctiva clear, non icteric. PERRLA.  Ears: External ears and TMs normal BL.  Nose: Septum midline, nasal mucosa pink and moist. No discharge.  Mouth / Throat: Normal dentition.  No oral lesions. Pharynx  erythematous, tonsils with hypertrophy. No White exudates - more swelling on left tonsil with no obstruction.   Neck: Supple,  enlarged LN, trachea midline.  Heart: S1 and S2 normal, no murmurs, clicks, gallops or rubs. Regular rate and rhythm. Chest: Clear; no wheezes or rales.  The abdomen is soft without tenderness, guarding, mass, rebound or organomegaly. Bowel sounds are normal.                  "

## 2023-02-27 ENCOUNTER — TELEPHONE (OUTPATIENT)
Dept: FAMILY MEDICINE | Facility: CLINIC | Age: 43
End: 2023-02-27
Payer: COMMERCIAL

## 2023-02-27 NOTE — TELEPHONE ENCOUNTER
Patient was seen by Kris Love PA-C on 2/24/2023. She was advised that if there was no improvement in her symptoms she would need to be seen.  She is calling today to get dome advice. She reports that the swelling of the tonsil has gone done but she still has a fever and it is still very painful to swallow. When she swallows the pain is not in the tonsil, it is in the lymph nodes and down. She has not started the prednisone because she reports it did not go well the last time she took it. She states that she told the provider this, but he still wanted her to try it. She agagin stated that her lymph nodes are swollen and she has been taking the hydrocodone and ibuprofen and it does not really help. She is tearful on the phone as she is concerned that she may have to have her tonsil removed.      Nursing advice: Due to her current symptoms, no improvement in her symptoms and she still has a fever she will need to be assessed at the E.R. now. Patient verbalized good understanding, agrees with plan and needs no further support.  Thank you. Johanne James R.N.    Per OV note dated 2/24/2023 from Kris Love PA-C is as follows:    Talk to patient about her concerns.  Her pain is out of proportion to her exam.  I am concerned that there might be starts of a tonsillar abscess.  We will put her on Augmentin and prednisone and Norco.  Because it is the weekend I discussed kaylynn with her that if her symptoms worsen she needs to be seen in the emergency room.  Warning signs discussed.  side effects discussed  Symptomatic treatment: such as fluids,  OTC acetaminophen and /or non-steroidal anti-inflammatory medication.     Return in about 2 days (around 2/26/2023) for recheck, As Needed.     Kris Love PA-C  [facility]

## 2023-04-16 ENCOUNTER — HEALTH MAINTENANCE LETTER (OUTPATIENT)
Age: 43
End: 2023-04-16

## 2023-07-05 NOTE — LETTER
9/21/2017         RE: Ledy Oconnor  [address]   [place] [address]        Dear Colleague,    Thank you for referring your patient, Ledy Oconnor, to the [facility]. Please see a copy of my visit note below.    Chief Complaint:   Chief Complaint   Patient presents with     Wrist Pain     Left wrist/thumb pain. Onset: 6-7/2017. Patient states any times she moves her thumb in certain ways it causes pain. She has tried wearing a thumb brace, doesn't help. Uses ibuprofen for pain. She has used as at times. Pain over lateral wrist, radial side. Has a constant ache.        Ledy Oconnor is seen today in the [facility] for evaluation of left wrist/thumb pain at the request of Cathryn Mcgregor PA-C.     HPI: Ledy Oconnor is a 37 year old female, right -hand dominant, who presents for evaluation and management of a left wrist pain, no known injury. Pain has been present since 6-7/2017. Since that time, pain has not improved. She started to notice the pain about 1 month after having her daughter. She was seen and given a brace. Her pain is located over the lateral wrist, over the radial side.  Pain is moderate, rated a 4/10. At worst, her pain is a 10/10.  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 For treatment she tries the brace, ibuprofen and ice.         She reports having mild pain/discomfort around the wrist site. She denies associated numbness or tingling. She denies any other injuries to her upper extremity.   Symptoms: moderate pain, no swelling.  Location of symptoms: left wrist/thumb.  Pain severity: 4/10, up to a 10/10  Pain quality: aching and sharp  Frequency of symptoms: frequently  Aggravating factors: repetitive motion, lifting her daughter, work duties..  Relieving factors: at rest, with ibuprofen, wrist brace.    Previous treatment: wrist brace, ibuprofen, and ice.    Past medical history:  has a past  "medical history of Headache.   Patient Active Problem List    Diagnosis Date Noted     Fibroid uterus 11/07/2016     Priority: Medium     Cluster headache, chronic 07/28/2015     Priority: Medium       Past surgical history:  has a past surgical history that includes no history of surgery.     Medications:    Current Outpatient Prescriptions   Medication Sig Dispense Refill     ibuprofen (ADVIL/MOTRIN) 800 MG tablet Take 1 tablet (800 mg) by mouth every 8 hours as needed for moderate pain With food. 90 tablet 1        Allergies:   No Known Allergies     Family History: family history includes Migraines in her mother; Migraines (age of onset: 30) in her brother.     Social History: [employer].  reports that she has quit smoking. Her smoking use included Cigarettes. She smoked 0.00 packs per day. She has never used smokeless tobacco. She reports that she does not drink alcohol or use illicit drugs.    Review of Systems:  ROS: 10 point ROS neg other than the symptoms noted above in the HPI and past medical history.    This document serves as a record of the services and decisions personally performed and made by Shawn Grant MD. It was created on his behalf by Kaya Delaney, a trained medical scribe. The creation of this document is based the provider's statements to the medical scribe.    Scribe Kaya Delaney 3:49 PM 9/21/2017    Physical Exam  GENERAL APPEARANCE: healthy, alert, no distress.   SKIN: no suspicious lesions or rashes  NEURO: Normal strength and tone, mentation intact and speech normal  PSYCH:  mentation appears normal and affect normal. Not anxious.  RESPIRATORY: No increased work of breathing.    Resp 16  Ht 1.575 m (5' 2\")  Wt 76.9 kg (169 lb 9.6 oz)  BMI 31.02 kg/m2     LEFT HAND / WRIST EXAM:    Skin intact. No abnormal skin discoloration, erythema or ecchymosis.  Oblique thenar scar (states from childhood injury).  Normal wear pattern, color and tone.  No observable or palpable masses of the " fingers or palm or wrist.  No palpable triggering of fingers.   No observable or palpable cords or nodules of the fingers or palm.    There is no swelling in the wrist or at the base of the thumb.  There is moderate tenderness over the first extensors.  There is no ecchymosis.  There is no erythema of the surrounding skin.  There is no maceration of the skin.  There is no deformity in the area.  Intact extensors. No extensor lag.    Special tests wrist: .   Finkelstein's test: positive.    1st carpometacarpal grind: negative     Intact sensation light touch median, radial, ulnar nerves of the hand  Intact sensation to the radial and ulnar digital nerves of the fingers, as well as the finger tips.  Intact epl fpl fdp edc wrist flexion/extension biceps/triceps deltoid  Brisk capillary refill to all fingers.   Palpable radial pulse, 2+.    X-rays:  3 views left wrist from 9/21/2017 were reviewed in clinic today. On my review, no obvious fractures or deformities.    Assessment: 37 year old female with left wrist/thumb pain, deQuervain's tenosynovitis.    Plan:  Nonoperative treatment. Expect improvement in most cases 6-8 weeks.  * Rest. Immobilization in removable thumb spica splints.   * will refer to [facility] for thumb based handsplints, as well as treatment modalities as indicated.  * Activity modification - avoid activities that aggravate symptoms.  * NSAIDS - regular use for inflammation, with food, as long as no contra-indications. Please discuss with pcp and pediatrician if needed, especially if breastfeeding  * Ice twice daily to three times daily.  * Tylenol as needed for pain  * Injections: discussed, patient elects to proceed. See procedure note below.  * Return to clinic as needed.    PROCEDURE NOTE:  The risks, perceived benefits and potential complications (including but not limited to: bleeding, infection, pain, scar, damage to adjacent structures, atrophy or necrosis of soft tissue,  skin blanching, failure to relieve symptoms, worsening of symptoms, allergic reaction) of injection were discussed with the patient. Questions were addressed and answered.The patient elected to proceed. Written informed consent was obtained. The correct procedural site was identified and confirmed. A Left Wrist dequervain-1st dorsal compartment injection was performed using .5 cc  Kenalog-40 40mg per mL and .5 cc of local anesthetic after sterile prep, to the correct procedural site. Sterile bandaid applied. This was tolerated well by the patient. No apparent complications. Did also discuss that if diabetic, recommend close monitoring of blood sugars over the next week as cortisone injections can temporarily elevate blood sugars.       The information in this document, created by a scribe for me, accurately reflects the services I personally performed and the decisions made by me. I have reviewed and approved this document for accuracy.     Shawn Grant M.D., M.S.  Dept. of Orthopaedic Surgery  [facility]        The patient's left wrist DeQuervain's was prepped with betadine solution after verification of allergies. Area approximately 10 cm x 10 cm prepped in a sterile fashion. After injection, betadine removed with soap and water and band-aids applied.    0.5cc Lidocaine 1% NDC 6370-3768-20, LOT -[id number],  2019  0.5cc Kenalog 40 NDC 1522-1733-34, LOT [id number],   injected into patient's left wrist DeQuervain's by:   Kris Washington PA-C, CAQ (Ortho)  Supervising Physician: Shawn Grant M.D., M.S.  Dept. of Orthopaedic Surgery  [facility]          Again, thank you for allowing me to participate in the care of your patient.        Sincerely,        Shawn Grant MD     Cyclophosphamide Counseling:  I discussed with the patient the risks of cyclophosphamide including but not limited to hair loss, hormonal abnormalities, decreased fertility, abdominal pain, diarrhea, nausea and vomiting, bone marrow suppression and infection. The patient understands that monitoring is required while taking this medication.

## 2023-07-10 ENCOUNTER — ANCILLARY PROCEDURE (OUTPATIENT)
Dept: GENERAL RADIOLOGY | Facility: CLINIC | Age: 43
End: 2023-07-10
Attending: PEDIATRICS
Payer: COMMERCIAL

## 2023-07-10 ENCOUNTER — OFFICE VISIT (OUTPATIENT)
Dept: ORTHOPEDICS | Facility: CLINIC | Age: 43
End: 2023-07-10
Payer: COMMERCIAL

## 2023-07-10 VITALS
DIASTOLIC BLOOD PRESSURE: 84 MMHG | BODY MASS INDEX: 29.45 KG/M2 | SYSTOLIC BLOOD PRESSURE: 116 MMHG | HEART RATE: 95 BPM | WEIGHT: 161 LBS

## 2023-07-10 DIAGNOSIS — M75.41 IMPINGEMENT SYNDROME OF RIGHT SHOULDER: Primary | ICD-10-CM

## 2023-07-10 DIAGNOSIS — M75.101 ROTATOR CUFF SYNDROME OF RIGHT SHOULDER: ICD-10-CM

## 2023-07-10 DIAGNOSIS — S49.91XA INJURY OF RIGHT SHOULDER, INITIAL ENCOUNTER: ICD-10-CM

## 2023-07-10 PROCEDURE — 73030 X-RAY EXAM OF SHOULDER: CPT | Mod: TC | Performed by: RADIOLOGY

## 2023-07-10 PROCEDURE — 99203 OFFICE O/P NEW LOW 30 MIN: CPT | Performed by: PEDIATRICS

## 2023-07-10 NOTE — LETTER
7/10/2023         RE: Ledy Oconnor  [address]  [place] [address]        Dear Colleague,    Thank you for referring your patient, Ledy Oconnor, to the [facility]. Please see a copy of my visit note below.    ASSESSMENT & PLAN    Ledy was seen today for pain.    Diagnoses and all orders for this visit:    Impingement syndrome of right shoulder  -     XR Shoulder Right G/E 3 Views; Future    Rotator cuff syndrome of right shoulder      This issue is chronic and Unchanged.    Low suspicion for rotator cuff tear given current history and exam.  Recommended rest from irritating activities coupled with physical therapy.  Would consider further imaging or treatment pending clinical course.    Plan:  - Today's Plan of Care:  Home Exercise Program    Discussed activity considerations and other supportive care including Ice/Heat, OTC and other topical medications as needed.    -We also discussed other future treatment options:  Referral to Physical Therapy  Consideration of injection  MRI Right Shoulder    Follow Up: as needed    Concerning signs and symptoms were reviewed and all questions were answered at this time.    Vida Peters MD [facility]  Sports Medicine Physician  [facility]      -----  Chief Complaint   Patient presents with     Right Shoulder - Pain       SUBJECTIVE  Ledy Oconnor is a/an 42 year old female who is seen as a self referral for evaluation of right shoulder pain.     The patient is seen by themselves.  The patient is Right handed    Onset: 1 years(s) ago. 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  CV: distal perfusion intact  Resp: normal respiratory effort without conversational dyspnea   Psych: normal mood and affect  Gait: NORMAL  Neuro: Normal light sensory exam of upper extremity    Bilateral Shoulder exam  Inspection and Posture:       normal    Skin:        no visible deformities    Tender:        none    Non Tender:       remainder of shoulder bilateral    ROM:        forward flexion 180  right       abduction 160 right with pain       internal rotation lumbar region right       external rotation 35 right       asymmetric scapular motion    Painful motions:       elevation right    Strength:        abduction 5/5 bilateral       flexion 5/5 bilateral       internal rotation 5/5 bilateral       external rotation 5/5 bilateral    Impingement testing:       neg (-) Neer right       positive (+) Arias right    Sensation:        normal sensation over shoulder and upper extremity     RADIOLOGY:  Final results and radiologist's interpretation, available in the [facility] health record.  Images were reviewed with the patient in the office today.  My personal interpretation of the performed imagin XR views of right shoulder reviewed: no acute bony abnormality, no significant degenerative change  - will follow official read        Review of the result(s) of each unique test - XR             Again, thank you for allowing me to participate in the care of your patient.        Sincerely,        Vida Peters MD

## 2023-07-10 NOTE — PROGRESS NOTES
ASSESSMENT & PLAN    Ledy was seen today for pain.    Diagnoses and all orders for this visit:    Impingement syndrome of right shoulder  -     XR Shoulder Right G/E 3 Views; Future    Rotator cuff syndrome of right shoulder      This issue is chronic and Unchanged.    Low suspicion for rotator cuff tear given current history and exam.  Recommended rest from irritating activities coupled with physical therapy.  Would consider further imaging or treatment pending clinical course.    Plan:  - Today's Plan of Care:  Home Exercise Program    Discussed activity considerations and other supportive care including Ice/Heat, OTC and other topical medications as needed.    -We also discussed other future treatment options:  Referral to Physical Therapy  Consideration of injection  MRI Right Shoulder    Follow Up: as needed    Concerning signs and symptoms were reviewed and all questions were answered at this time.    Vida Peters MD [facility]  Sports Medicine Physician  [facility]      -----  Chief Complaint   Patient presents with     Right Shoulder - Pain       SUBJECTIVE  Ledy Oconnor is a/an 42 year old female who is seen as a self referral for evaluation of right shoulder pain.     The patient is seen by themselves.  The patient is Right handed    Onset: 1 years(s) ago. 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  CV: distal perfusion intact  Resp: normal respiratory effort without conversational dyspnea   Psych: normal mood and affect  Gait: NORMAL  Neuro: Normal light sensory exam of upper extremity    Bilateral Shoulder exam  Inspection and Posture:       normal    Skin:        no visible deformities    Tender:        none    Non Tender:       remainder of shoulder bilateral    ROM:        forward flexion 180  right       abduction 160 right with pain       internal rotation lumbar region right       external rotation 35 right       asymmetric scapular motion    Painful motions:       elevation right    Strength:        abduction 5/5 bilateral       flexion 5/5 bilateral       internal rotation 5/5 bilateral       external rotation 5/5 bilateral    Impingement testing:       neg (-) Neer right       positive (+) Arias right    Sensation:        normal sensation over shoulder and upper extremity     RADIOLOGY:  Final results and radiologist's interpretation, available in the [facility] health record.  Images were reviewed with the patient in the office today.  My personal interpretation of the performed imagin XR views of right shoulder reviewed: no acute bony abnormality, no significant degenerative change  - will follow official read        Review of the result(s) of each unique test - XR

## 2023-07-10 NOTE — PATIENT INSTRUCTIONS
Low suspicion for rotator cuff tear given current history and exam.  Recommended rest from irritating activities coupled with physical therapy.  Would consider further imaging or treatment pending clinical course.    Plan:  - Today's Plan of Care:  Home Exercise Program    Discussed activity considerations and other supportive care including Ice/Heat, OTC and other topical medications as needed.    -We also discussed other future treatment options:  Referral to Physical Therapy  Consideration of injection  MRI Right Shoulder    Follow Up: as needed    If you have any further questions for your physician or physician s care team you can call [phone] and use option 3 to leave a voice message.

## 2023-08-19 ENCOUNTER — OFFICE VISIT (OUTPATIENT)
Dept: URGENT CARE | Facility: URGENT CARE | Age: 43
End: 2023-08-19
Payer: COMMERCIAL

## 2023-08-19 VITALS
OXYGEN SATURATION: 97 % | SYSTOLIC BLOOD PRESSURE: 125 MMHG | DIASTOLIC BLOOD PRESSURE: 87 MMHG | TEMPERATURE: 98.5 F | BODY MASS INDEX: 30.18 KG/M2 | HEART RATE: 91 BPM | WEIGHT: 165 LBS

## 2023-08-19 DIAGNOSIS — J22 LOWER RESPIRATORY INFECTION: Primary | ICD-10-CM

## 2023-08-19 PROCEDURE — 99213 OFFICE O/P EST LOW 20 MIN: CPT | Performed by: PHYSICIAN ASSISTANT

## 2023-08-19 RX ORDER — AZITHROMYCIN 250 MG/1
TABLET, FILM COATED ORAL
Qty: 6 TABLET | Refills: 0 | Status: SHIPPED | OUTPATIENT
Start: 2023-08-19 | End: 2023-08-24

## 2023-08-19 RX ORDER — ALBUTEROL SULFATE 0.83 MG/ML
2.5 SOLUTION RESPIRATORY (INHALATION) EVERY 4 HOURS PRN
Qty: 90 ML | Refills: 0 | Status: SHIPPED | OUTPATIENT
Start: 2023-08-19

## 2023-08-19 RX ORDER — BENZONATATE 100 MG/1
CAPSULE ORAL
Qty: 30 CAPSULE | Refills: 0 | Status: SHIPPED | OUTPATIENT
Start: 2023-08-19

## 2023-08-19 RX ORDER — PREDNISONE 20 MG/1
20 TABLET ORAL 2 TIMES DAILY
Qty: 10 TABLET | Refills: 0 | Status: SHIPPED | OUTPATIENT
Start: 2023-08-19 | End: 2023-08-24

## 2023-08-19 NOTE — PATIENT INSTRUCTIONS
Azithromycin 250 mg as directed- 2 pills together at the same time today then 1 pill a day for the next 4 days.  Prednisone twice daily for 5 days  Tessalon Perles- take 1-2 capsules 3 times daily as needed for cough.  Albuterol treatment every 4 hours as needed for shortness of breath.    Rest! Your body needs more rest to heal.  Drink plenty of fluids (warm fluids like tea or soup are soothing and reduce cough)  Sit in the bathroom with a hot shower running and breathe in the steam.  Honey may soothe your sore throat and help manage your cough- may take straight or in warm water with lemon juice.  Avoid smoke (cigarettes, bonfires, fireplace, wood burning stoves).  Take Tylenol or an NSAID such as ibuprofen or naproxen as needed for pain.  Delsym (dextromethorphan polistirex) is an over the counter cough medication that lasts 12 hours.   Mucinex or Robitussin (guiafenesin) thin mucus and may help it to loosen more quickly  Good handwashing is the best way to prevent spread of germs  Present to emergency room if you develop trouble breathing, swallowing or cough-up blood.  Follow up with your primary care provider if symptoms worsen or fail to improve as expected.

## 2023-08-19 NOTE — PROGRESS NOTES
Assessment & Plan     Lower respiratory infection  - predniSONE (DELTASONE) 20 MG tablet; Take 1 tablet (20 mg) by mouth 2 times daily for 5 days With breakfast and lunch  - benzonatate (TESSALON) 100 MG capsule; Take 1-2 capsules by mouth up to 3 times daily as needed for cough.  - azithromycin (ZITHROMAX) 250 MG tablet; Take 2 tablets (500 mg) by mouth daily for 1 day, THEN 1 tablet (250 mg) daily for 4 days.  - albuterol (PROVENTIL) (2.5 MG/3ML) 0.083% neb solution; Take 1 vial (2.5 mg) by nebulization every 4 hours as needed for shortness of breath, wheezing or cough    Dry cough, lung exam with wheezing in bilateral bases, no rales or rhonchi.  Treat with albuterol nebulizer, prednisone, add Tessalon Perles as needed to control coughing.  We discussed that with symptoms worsening after 2 weeks we will also add azithromycin.  Follow-up with PCP or in urgent care if symptoms worsen or fail to improve after 1 week.    Return in about 1 week (around 8/26/2023) for visit with primary care provider if not improving.     Cierra Rodriguez PA-C  [facility]    Subjective   Ledy Oconnor is a 43 year old who presents for the following health issues     Patient presents with:  Fever: Sx 2  weeks low grade almost every day.   Cough: Sx for the past 2 weeks. Wheezing and exhaling . Not sleeping , effecting her work and driving.     ARCELIA Villafana presents clinic today for evaluation of a cough.  Symptoms first began 2 weeks ago and seem to be worsening over time.  Her cough has been dry and wheezy.  She states the cough is much worse at night and she is having a very difficult time getting a good night of sleep.  She is feeling some shortness of breath and has had very low-grade fevers with a temperature around 99-100F associated with body aches and chills.  She is taken over-the-counter medications including Mucinex DM, Aleve-D, DayQuil, NyQuil.  She also tried using her daughter's albuterol  nebulizer.  Ibuprofen is the only thing that has given her any significant relief.    Review of Systems   ROS negative except as stated above.      Objective    /87   Pulse 91   Temp 98.5  F (36.9  C) (Tympanic)   Wt 74.8 kg (165 lb)   LMP  (LMP Unknown)   SpO2 97%   BMI 30.18 kg/m    Physical Exam   GENERAL: healthy, alert and no distress  EYES: Eyes grossly normal to inspection, PERRL and conjunctivae and sclerae normal  HENT: ear canals and TM's normal, nose and mouth without ulcers or lesions  NECK: no adenopathy, no asymmetry, masses, or scars and thyroid normal to palpation  RESP: lungs minimal inspiratory wheezing, stronger expiratory wheezes in bilateral lung bases to auscultation - no rales, rhonchi, no increased respiratory effort  CV: regular rate and rhythm, normal S1 S2, no S3 or S4, no murmur, click or rub, no peripheral edema and peripheral pulses strong    No results found for any visits on 08/19/23.

## 2023-08-28 ENCOUNTER — ANCILLARY PROCEDURE (OUTPATIENT)
Dept: GENERAL RADIOLOGY | Facility: CLINIC | Age: 43
End: 2023-08-28
Attending: STUDENT IN AN ORGANIZED HEALTH CARE EDUCATION/TRAINING PROGRAM
Payer: COMMERCIAL

## 2023-08-28 ENCOUNTER — OFFICE VISIT (OUTPATIENT)
Dept: URGENT CARE | Facility: URGENT CARE | Age: 43
End: 2023-08-28
Payer: COMMERCIAL

## 2023-08-28 VITALS
DIASTOLIC BLOOD PRESSURE: 92 MMHG | HEART RATE: 71 BPM | WEIGHT: 170 LBS | SYSTOLIC BLOOD PRESSURE: 136 MMHG | BODY MASS INDEX: 31.09 KG/M2 | OXYGEN SATURATION: 99 % | TEMPERATURE: 98.2 F

## 2023-08-28 DIAGNOSIS — R05.2 SUBACUTE COUGH: ICD-10-CM

## 2023-08-28 DIAGNOSIS — J18.9 PNEUMONIA OF BOTH LOWER LOBES DUE TO INFECTIOUS ORGANISM: Primary | ICD-10-CM

## 2023-08-28 PROCEDURE — 99214 OFFICE O/P EST MOD 30 MIN: CPT | Performed by: STUDENT IN AN ORGANIZED HEALTH CARE EDUCATION/TRAINING PROGRAM

## 2023-08-28 PROCEDURE — 71046 X-RAY EXAM CHEST 2 VIEWS: CPT | Mod: TC | Performed by: RADIOLOGY

## 2023-08-28 PROCEDURE — 87635 SARS-COV-2 COVID-19 AMP PRB: CPT | Performed by: STUDENT IN AN ORGANIZED HEALTH CARE EDUCATION/TRAINING PROGRAM

## 2023-08-28 NOTE — PROGRESS NOTES
Assessment & Plan     Pneumonia of both lower lobes due to infectious organism  Subacute cough  43-year-old woman with recent treatment on 8/19 with prednisone and azithromycin who presents with 3 weeks of persistent cough and nasal congestion and sinus pain likely due to bacterial pneumonia and bacterial sinusitis.  Vitals within normal limits.  On exam, the patient is nontoxic-appearing, well-hydrated and perfused with tenderness to palpation of the maxillary sinuses, diminished air entry on the left posterior lung fields and rhonchi in the right posterior lung fields.  COVID-19 PCR in process.  Chest x-ray showed bilateral opacities in the lower and middle lobes.  Plan: Augmentin X 5 days, fluids, Tylenol/ibuprofen.  Discussed return precautions.  The patient's questions were addressed and she verbalized understanding.  - XR Chest 2 Views  - Symptomatic COVID-19 Virus (Coronavirus) by PCR Nasopharyngeal  - amoxicillin-clavulanate (AUGMENTIN) 875-125 MG tablet  Dispense: 10 tablet; Refill: 0             No follow-ups on file.    Jeana Fermin MD  [facility]    Lakshmi Villafana is a 43 year old female who presents to clinic today for the following health issues:  Chief Complaint   Patient presents with    Cough     Pt was seen on the 19th, cough with phlegm now, fatigue, nasal congestion, chest tightness, occasional fevers. Sx 3 weeks.   Pt has done 3 at home Covid tests - all negative.   States has not had chest x-rays or Covid tests done in clinic and even with meds that were prescribed 19th, sx worsening.   Pt has been using neb solution 2x daily.      HPI    URI Adult    Onset of symptoms was 3 week(s) ago.  Course of illness is worsening.    Was seen on 8/19 and given prednisone 40 mg x 5 days, azithromycin x 5 days, and albuterol nebulizer  Current and Associated symptoms: cough with phlegm, nasal congestion, low grade fever (99-100F), fatigue  Treatment measures tried include  Decongestants, Nebulizer (name: albuterol), and Fluids.  Predisposing factors include None.  She had negative COVID test     Review of Systems  Constitutional, HEENT, cardiovascular, pulmonary, gi and gu systems are negative, except as otherwise noted.      Objective    BP (!) 136/92   Pulse 71   Temp 98.2  F (36.8  C) (Tympanic)   Wt 77.1 kg (170 lb)   LMP  (LMP Unknown)   SpO2 99%   BMI 31.09 kg/m    Physical Exam   GENERAL: healthy, alert and no distress  EYES: Eyes grossly normal to inspection, PERRL and conjunctivae and sclerae normal  HENT: ear canals and TM's normal, nose and mouth without ulcers or lesions  NECK: no adenopathy  RESP: diminished air entry on the left posterior lung fields and rhonchi in the right posterior lung fields.    CV: regular rate and rhythm, normal S1 S2, no S3 or S4, no murmur, normal cap refill, and peripheral pulses strong  MS: no gross musculoskeletal defects noted, no edema  SKIN: no suspicious lesions or rashes  NEURO: No focal neurologic deficits    XR Chest 2 Views    Result Date: 8/28/2023  CHEST TWO VIEWS  8/28/2023 12:35 PM HISTORY:  Subacute cough. COMPARISON: 1/2/2014.     IMPRESSION: Mild curvilinear opacities in both mid and lower lungs are new since the previous exam, and could be related to atelectasis or infection. No pleural effusions. No pneumothorax. Heart size and pulmonary vascularity are within normal limits.

## 2023-08-29 LAB — SARS-COV-2 RNA RESP QL NAA+PROBE: NEGATIVE

## 2023-09-17 ENCOUNTER — HEALTH MAINTENANCE LETTER (OUTPATIENT)
Age: 43
End: 2023-09-17

## 2024-01-11 ENCOUNTER — MYC REFILL (OUTPATIENT)
Dept: DERMATOLOGY | Facility: CLINIC | Age: 44
End: 2024-01-11
Payer: COMMERCIAL

## 2024-01-11 ENCOUNTER — TELEPHONE (OUTPATIENT)
Dept: DERMATOLOGY | Facility: CLINIC | Age: 44
End: 2024-01-11
Payer: COMMERCIAL

## 2024-01-11 DIAGNOSIS — L70.0 ACNE VULGARIS: ICD-10-CM

## 2024-01-11 RX ORDER — SPIRONOLACTONE 50 MG/1
TABLET, FILM COATED ORAL
OUTPATIENT
Start: 2024-01-11

## 2024-01-11 NOTE — TELEPHONE ENCOUNTER
[facility] Call Center    Phone Message    May a detailed message be left on voicemail: no     Reason for Call: Medication Refill Request    Has the patient contacted the pharmacy for the refill? Yes   Name of medication being requested: Appt with Rachael Guallpa tomorrow.  Please submit Refill for: spironolactone (ALDACTONE) 50 MG tablet    Provider who prescribed the medication: DENILSON Guallpa  Pharmacy: [id number] - [place], [place] - [address] [facility] AT [facility] & [facility] [address]. Questions call Ledy @ [phone].    Date medication is needed: 1/11/24       Action Taken: Other: [facility]    Travel Screening: Not Applicable

## 2024-01-11 NOTE — TELEPHONE ENCOUNTER
Spoke with patient and let her know we cannot refill the rx as we need updated lab work that can be done after appointment with Lainey 1/12/24    Patient expressed understanding.     Declined refill.    Darya GRIGGS RN BSN  [facility]  [phone]

## 2024-01-11 NOTE — TELEPHONE ENCOUNTER
Spoke with patient and let her know we cannot refill the rx as we need updated lab work that can be done after appointment with Lainey 1/12/24     Patient expressed understanding.      Declined refill.     Darya GRIGGS RN BSN  [facility]  [phone]

## 2024-01-11 NOTE — TELEPHONE ENCOUNTER
spironolactone (ALDACTONE) 50 MG tablet     Patient comment: I was irresponsible regarding instructions when to take. After a month of no results I decided to stop. What I didnt realize is that it takes several months to see results. As of late I've broke out in several nodules on my jawline and lower chin areas. I use my prescribed cream trilectone(sp) but its not enough. Would you be do kind to release this prescription in an effort to help clear these deep rooted acne issues? Thank you,     Kaitlynn Chester RN  Central Triage Red Flags/Med Refills

## 2024-01-12 ENCOUNTER — OFFICE VISIT (OUTPATIENT)
Dept: DERMATOLOGY | Facility: CLINIC | Age: 44
End: 2024-01-12
Payer: COMMERCIAL

## 2024-01-12 DIAGNOSIS — L70.0 ACNE VULGARIS: ICD-10-CM

## 2024-01-12 PROCEDURE — 99213 OFFICE O/P EST LOW 20 MIN: CPT | Performed by: NURSE PRACTITIONER

## 2024-01-12 RX ORDER — TRETINOIN 0.5 MG/G
CREAM TOPICAL AT BEDTIME
Qty: 45 G | Refills: 11 | Status: SHIPPED | OUTPATIENT
Start: 2024-01-12 | End: 2024-05-16

## 2024-01-12 RX ORDER — SPIRONOLACTONE 50 MG/1
TABLET, FILM COATED ORAL
Qty: 180 TABLET | Refills: 1 | Status: SHIPPED | OUTPATIENT
Start: 2024-01-12 | End: 2024-05-16

## 2024-01-12 RX ORDER — CLINDAMYCIN PHOSPHATE 10 UG/ML
LOTION TOPICAL EVERY MORNING
Qty: 60 ML | Refills: 11 | Status: SHIPPED | OUTPATIENT
Start: 2024-01-12 | End: 2024-05-16

## 2024-01-12 NOTE — PROGRESS NOTES
[facility] Dermatology Note  Encounter Date: Jan 12, 2024  Office Visit     Reviewed patients past medical history and pertinent chart review prior to patients visit today.     Dermatology Problem List:  9 nevus on chin biopsy 06/23/22  Acne, restarted spironolactone 100 mg daily and tretinoin 0.05% cream and clindamycin lotion  ____________________________________________    Assessment & Plan:     # Acne vulgaris    -start Clindamycin 1% lotion to use 1-2 times daily   -Recommend benzoyl peroxide 10% wash to all areas of acne. Patient counseled medication can bleach towels and clothing.  -Start tretinoin 0.05% cream. Apply once every other day and increase to nightly as tolerate.  Waiting a few minutes after washing affected areas will decrease irritation. Method of application, side effects and expected results were discussed. The patient will apply pea size amount to the entire face, avoid areas around the eyes, corners of nose and mouth. Discussed side effects including photosensitivity and irritation.   -moisturize with a cream such as Cetaphil cream, Cera Ve Cream or Vanicream nightly and more PRN for dryness.    -Start spironolactone 100 mg daily. This is a medication used in high doses for blood pressure but in lower doses for acne due to its anti-male hormone effects. Main side effects are dizziness, menstrual spotting, breast tenderness, high potassium, and can be harmful to a fetus if you were to become pregnant. It is very important to avoid pregnancy while on this medication and to stop it immediately if you do become pregnant. Advised to stay well hydrated while on the medication and notify us if she were to feel heart palpitations. She is a young and healthy individual so we are not checking potassium levels regularly unless side effects occur.      Follow-up: 3-12 months for recheck of acne, 1 years for follow up full body skin exam, prn for new or changing lesions or new  concerns    Lainey Guallpa, JINA  Dermatology   ____________________________________________    CC: Acne (October started with neck rash that is deep with severe itching. Tried old rxTret and no improvement/Stopped spironolactone and Tret after 1-2 months of use for the facial/hormonal acne. Can express fluid with a lot of pressure.)    HPI:  Ms. Ledy Oconnor is a(n) 43 year old female who presents today as a return patient for follow up of acne. She tried tretinoin 0.025% cream and spironolactone 100 mg daily and had no side effects but did not see results after 1 month and stopped. She has started to break out more since October and wants to restart acne medications.     Patient is otherwise feeling well, without additional skin concerns.     Physical Exam:  Vitals: There were no vitals taken for this visit.  SKIN: face and neck  -open and closed comedones and cystic inflammatory lesions on lower chin, cheeks and neck    - No other lesions of concern on areas examined.     Medications:  Current Outpatient Medications   Medication    albuterol (PROVENTIL) (2.5 MG/3ML) 0.083% neb solution    benzonatate (TESSALON) 100 MG capsule    levonorgestrel (MIRENA) 20 MCG/24HR IUD    multivitamin w/minerals (THERA-VIT-M) tablet    Omega-3 Fatty Acids (FISH OIL) 1200 MG capsule    spironolactone (ALDACTONE) 25 MG tablet    spironolactone (ALDACTONE) 50 MG tablet    tretinoin (RETIN-A) 0.025 % external cream     No current facility-administered medications for this visit.     Facility-Administered Medications Ordered in Other Visits   Medication    sodium chloride (PF) 0.9% PF flush 3 mL      Past Medical History:   Patient Active Problem List   Diagnosis    Cluster headache, chronic    Fibroid uterus    PCOS (polycystic ovarian syndrome)    IUD (intrauterine device) in place     Past Medical History:   Diagnosis Date    Headache     History of PCOS        CC Soheila Louie PA-C  [facility]  [address] [address]  [place] [id number] on close of this encounter.

## 2024-01-12 NOTE — PROGRESS NOTES
[facility] Dermatology Note  Encounter Date: Jan 12, 2024  Office Visit     Reviewed patients past medical history and pertinent chart review prior to patients visit today.     Dermatology Problem List:  NUB chin biopsy 06/23/22  Acne, started spironolactone 100 mg daily and tretinoin 0.025% cream 06/23/22     ____________________________________________    Assessment & Plan:     # Neoplasm of uncertain behavior:  chin  DDx includes irritated nevus. Shave biopsy today.    Procedure Note: Biopsy by shave technique  The risks and benefits of the procedure were described to the patient. These include but are not limited to bleeding, infection, scar, incomplete removal, and non-diagnostic biopsy. Verbal informed consent was obtained. The above site(s) was cleansed with an alcohol pad and injected with 1% lidocaine with epinephrine. Once anesthesia was obtained, a biopsy(ies) was performed with Gilette blade. The tissue(s) was placed in a labeled container(s) with formalin and sent to pathology. Hemostasis was achieved with aluminum chloride. Vaseline and a bandage were applied to the wound(s). The patient tolerated the procedure well and was given post biopsy care instructions.     # Acne vulgaris    -Recommend benzoyl peroxide 10% wash to all areas of acne. Patient counseled medication can bleach towels and clothing.  -Start tretinoin 0.025% cream. Apply once every other day and increase to nightly as tolerate.  Waiting a few minutes after washing affected areas will decrease irritation. Method of application, side effects and expected results were discussed. The patient will apply pea size amount to the entire face, avoid areas around the eyes, corners of nose and mouth. Discussed side effects including photosensitivity and irritation.   -moisturize with a cream such as Cetaphil cream, Cera Ve Cream or Vanicream nightly and more PRN for dryness.    -Start spironolactone 50 mg daily for 1 week and  if tolerating it well without side effects can increase to 100 mg daily. This is a medication used in high doses for blood pressure but in lower doses for acne due to its anti-male hormone effects. Main side effects are dizziness, menstrual spotting, breast tenderness, high potassium, and can be harmful to a fetus if you were to become pregnant. It is very important to avoid pregnancy while on this medication and to stop it immediately if you do become pregnant. Advised to stay well hydrated while on the medication and notify us if she were to feel heart palpitations. She is a young and healthy individual so we are not checking potassium levels regularly unless side effects occur.      # Benign skin findings including: seborrheic keratoses, cherry angioma, lentigines and benign nevi.   - No further intervention required. Patient to report changes.   - Patient reassured of the benign nature of these lesions.    #Dilated pore with underlying cyst versus nevus, forehead.  Appears benign.    #Signs and Symptoms of non-melanoma skin cancer and ABCDEs of melanoma reviewed with patient. Patient encouraged to perform monthly self skin exams and educated on how to perform them. UV precautions reviewed with patient. Patient was asked about new or changing moles/lesions on body.     #Reviewed Sunscreen: Apply 20 minutes prior to going outdoors and reapply every two hours, when wet or sweating. We recommend using an SPF 30 or higher, and to use one that is water resistant.       Follow-up: 3 months for recheck of acne, 1 years for follow up full body skin exam, prn for new or changing lesions or new concerns    Yadira Guallpa, SHAVONNE CNP on 6/23/2022 at 2:04 PM    ____________________________________________    CC: Acne (October started with neck rash that is deep with severe itching. Tried old rxTret and no improvement/Stopped spironolactone and Tret after 1-2 months of use for the facial/hormonal acne. Can express fluid  with a lot of pressure.)    HPI:  Ms. Ledy Oconnor is a(n) 43 year old female who presents today as a new patient for a full body skin cancer screening. Patient has concerns today about acne on her face and back that she has had for several years.  She has tried many over-the-counter products without success.  She is very self-conscious and does not let anyone but her  or her mother see her back.  She also has a mole on her chin that she thinks is growing and changing.  She has had this for several years.  It does grow hairs that she plucks.  She is very self-conscious about a lot of her moles and concerned about them.  She has a larger more on her right lower leg that she has had all of her life.  She says most providers want her to get this checked by dermatology but she has put this off for many years..     Patient is otherwise feeling well, without additional skin concerns.     Physical Exam:  Vitals: There were no vitals taken for this visit.  SKIN: Total skin excluding the genitalia areas was performed. The exam included the head/face, neck, both arms, chest, back, abdomen, both legs, digits, mons pubis, buttock and nails.   -Chin, 5 mm skin colored dome-shaped papule with terminal hairs  -upper back and jawline has some inflammatory papules, and back has extensive small scarring  -right anterior lower leg, 1.4 x 1 cm darker brown slightly raised homogenous papule  -several 1-2mm red dome shaped symmetric papules scattered on the trunk  - 100+ tan/brown flat round macules and raised papules scattered throughout trunk, extremities and head. No worrisome features for malignancy noted on examination.  -scattered tan, homogenous macules scattered on sun exposed areas of trunk, extremities and face.   -A few scattered waxy, stuck on tan/brown papules and patches on the trunk   -Forehead, dilated pore with skin contents within and underlying skin colored slightly firm dome-shaped papule    - No other  lesions of concern on areas examined.     Medications:  Current Outpatient Medications   Medication    clindamycin (CLEOCIN T) 1 % external lotion    spironolactone (ALDACTONE) 50 MG tablet    tretinoin (RETIN-A) 0.05 % external cream    albuterol (PROVENTIL) (2.5 MG/3ML) 0.083% neb solution    benzonatate (TESSALON) 100 MG capsule    levonorgestrel (MIRENA) 20 MCG/24HR IUD    multivitamin w/minerals (THERA-VIT-M) tablet    Omega-3 Fatty Acids (FISH OIL) 1200 MG capsule    tretinoin (RETIN-A) 0.025 % external cream     No current facility-administered medications for this visit.     Facility-Administered Medications Ordered in Other Visits   Medication    sodium chloride (PF) 0.9% PF flush 3 mL      Past Medical History:   Patient Active Problem List   Diagnosis    Cluster headache, chronic    Fibroid uterus    PCOS (polycystic ovarian syndrome)    IUD (intrauterine device) in place     Past Medical History:   Diagnosis Date    Headache     History of PCOS            CC Soheila Louie PA-C  [facility] [place]  [address] [place] [address] on close of this encounter.

## 2024-01-12 NOTE — PATIENT INSTRUCTIONS
"Acne vulgaris    Morning regimen:    *Wash with either a gentle cleanser such as Cetaphil or Cera Ve unmedicated gentle cleanser. If at any point you are not experiencing dryness or peeling and want more improvement, try adding in a Benzoyl peroxide wash 5%-10%. All of these washes can be purchased over the counter at [employer], [employer], [employer] etc. If you have acne on your chest/back a benzoyl peroxide 10% wash can give you some added benefit if you use it in the shower to the affected areas, and let it sit on the skin for 3-5 minutes before rinsing it off. Benzoyl peroxide can cause dryness so if you're experiencing too much dryness stop the benzoyl peroxide wash and use just a gentle cleanser until dryness subsides. Benzoyl peroxide can also bleach fabrics so you may want to use white towels to avoid bleaching your towels.     *Then apply clindamycin 1% lotion to the entire face, as well as chest and back if affected.     *Apply a gentle moisturizer with SPF 30+ that says either \"noncomedogenic\" or \"oil free\" meaning it won't clog pores.     Evening regimen:    *Wash with either a gentle cleanser such as Cetaphil gentle cleanser or Benzoyl peroxide wash 5%. (See morning instructions for choosing a wash)      *Then apply clindamycin 1% lotion to the entire face, as well as chest and back if affected.     *Allow skin to fully dry again before applying medications. Applying retinods to moist skin will cause added dryness.     *Apply prescription retinoid (tretinoin) A pea sized amount will be enough for the entire face. More is not better, it will just add to the dryness. Apply to chest and back areas as well if affected. This is not a spot treatment so make sure you're covering the entire area that is affected by acne. When you're first starting a retinoid you WILL be dry and may have some flaking of the skin. This is an expected response so keep using the medication. If the dryness is not controlled with " moisturizing often, you may want to decrease how often you use it temporarially and slowly increase the frequency to nightly use as the dryness subsides. For most people I would recommend starting use every 3rd night for a few weeks, then increasing to every other night for a few weeks then when you feel like you can tolerate it go up to nightly use. This should help the dryness.     *Apply a gentle moisturizing CREAM. Cera Ve cream, Cetaphil Cream, or Vanicream are good options.         It may take 2-3 months to see 50-70% improvement. It is important to give the topical medications time to clean out your pores and prevent new acne from forming. Sometimes people flare with more acne after starting a new regimen and this is okay. If the flare is severe please call our office and speak with a nurse about your acne.  Otherwise, please continue use of this regimen for the next 3 months and come back for a reevaluation with me and we can adjust your plan at that time.

## 2024-01-12 NOTE — LETTER
1/12/2024         RE: Ledy Oconnor  [address]  [place] [address]        Dear Colleague,    Thank you for referring your patient, Leyd Oconnor, to the [facility]. Please see a copy of my visit note below.    [facility] Dermatology Note  Encounter Date: Jan 12, 2024  Office Visit     Reviewed patients past medical history and pertinent chart review prior to patients visit today.     Dermatology Problem List:  9 nevus on chin biopsy 06/23/22  Acne, restarted spironolactone 100 mg daily and tretinoin 0.05% cream and clindamycin lotion  ____________________________________________    Assessment & Plan:     # Acne vulgaris    -start Clindamycin 1% lotion to use 1-2 times daily   -Recommend benzoyl peroxide 10% wash to all areas of acne. Patient counseled medication can bleach towels and clothing.  -Start tretinoin 0.05% cream. Apply once every other day and increase to nightly as tolerate.  Waiting a few minutes after washing affected areas will decrease irritation. Method of application, side effects and expected results were discussed. The patient will apply pea size amount to the entire face, avoid areas around the eyes, corners of nose and mouth. Discussed side effects including photosensitivity and irritation.   -moisturize with a cream such as Cetaphil cream, Cera Ve Cream or Vanicream nightly and more PRN for dryness.    -Start spironolactone 100 mg daily. This is a medication used in high doses for blood pressure but in lower doses for acne due to its anti-male hormone effects. Main side effects are dizziness, menstrual spotting, breast tenderness, high potassium, and can be harmful to a fetus if you were to become pregnant. It is very important to avoid pregnancy while on this medication and to stop it immediately if you do become pregnant. Advised to stay well hydrated while on the medication and notify us if she were to feel heart palpitations. She  is a young and healthy individual so we are not checking potassium levels regularly unless side effects occur.      Follow-up: 3-12 months for recheck of acne, 1 years for follow up full body skin exam, prn for new or changing lesions or new concerns    Lainey Guallpa CNP  Dermatology   ____________________________________________    CC: Acne (October started with neck rash that is deep with severe itching. Tried old rxTret and no improvement/Stopped spironolactone and Tret after 1-2 months of use for the facial/hormonal acne. Can express fluid with a lot of pressure.)    HPI:  Ms. Ledy Oconnor is a(n) 43 year old female who presents today as a return patient for follow up of acne. She tried tretinoin 0.025% cream and spironolactone 100 mg daily and had no side effects but did not see results after 1 month and stopped. She has started to break out more since October and wants to restart acne medications.     Patient is otherwise feeling well, without additional skin concerns.     Physical Exam:  Vitals: There were no vitals taken for this visit.  SKIN: face and neck  -open and closed comedones and cystic inflammatory lesions on lower chin, cheeks and neck    - No other lesions of concern on areas examined.     Medications:  Current Outpatient Medications   Medication     albuterol (PROVENTIL) (2.5 MG/3ML) 0.083% neb solution     benzonatate (TESSALON) 100 MG capsule     levonorgestrel (MIRENA) 20 MCG/24HR IUD     multivitamin w/minerals (THERA-VIT-M) tablet     Omega-3 Fatty Acids (FISH OIL) 1200 MG capsule     spironolactone (ALDACTONE) 25 MG tablet     spironolactone (ALDACTONE) 50 MG tablet     tretinoin (RETIN-A) 0.025 % external cream     No current facility-administered medications for this visit.     Facility-Administered Medications Ordered in Other Visits   Medication     sodium chloride (PF) 0.9% PF flush 3 mL      Past Medical History:   Patient Active Problem List   Diagnosis     Cluster headache, chronic      Fibroid uterus     PCOS (polycystic ovarian syndrome)     IUD (intrauterine device) in place     Past Medical History:   Diagnosis Date     Headache      History of PCOS        CC Soheila Louie PA-C  [facility]  [address] [place] [address] on close of this encounter.      Again, thank you for allowing me to participate in the care of your patient.        Sincerely,        SHAVONNE Roman CNP

## 2024-03-19 ENCOUNTER — TELEPHONE (OUTPATIENT)
Dept: ORTHOPEDICS | Facility: CLINIC | Age: 44
End: 2024-03-19

## 2024-03-19 NOTE — TELEPHONE ENCOUNTER
Called and left message for patient to reschedule appointment, as provider is out for the day.  Scheduling number was provided.    Will send MyChart as well.    Karen Sandhu, ATC

## 2024-05-16 ENCOUNTER — OFFICE VISIT (OUTPATIENT)
Dept: DERMATOLOGY | Facility: CLINIC | Age: 44
End: 2024-05-16
Attending: NURSE PRACTITIONER
Payer: COMMERCIAL

## 2024-05-16 DIAGNOSIS — L70.0 ACNE VULGARIS: ICD-10-CM

## 2024-05-16 PROCEDURE — 99214 OFFICE O/P EST MOD 30 MIN: CPT | Performed by: NURSE PRACTITIONER

## 2024-05-16 RX ORDER — TRETINOIN 1 MG/G
CREAM TOPICAL AT BEDTIME
Qty: 45 G | Refills: 11 | Status: SHIPPED | OUTPATIENT
Start: 2024-05-16

## 2024-05-16 RX ORDER — CLINDAMYCIN PHOSPHATE 10 UG/ML
LOTION TOPICAL EVERY MORNING
Qty: 60 ML | Refills: 11 | Status: SHIPPED | OUTPATIENT
Start: 2024-05-16

## 2024-05-16 RX ORDER — SPIRONOLACTONE 50 MG/1
150 TABLET, FILM COATED ORAL DAILY
Qty: 270 TABLET | Refills: 3 | Status: SHIPPED | OUTPATIENT
Start: 2024-05-16

## 2024-05-16 NOTE — LETTER
5/16/2024         RE: Ledy Oconnor  [address]  [place] [address]        Dear Colleague,    Thank you for referring your patient, Ledy Oconnor, to the [facility]. Please see a copy of my visit note below.    [facility] Dermatology Note  Encounter Date: May 16, 2024  Office Visit     Reviewed patients past medical history and pertinent chart review prior to patients visit today.     Dermatology Problem List:  9 nevus on chin biopsy 06/23/22  Acne, restarted spironolactone 100 mg daily and tretinoin 0.05% cream and clindamycin lotion  ____________________________________________    Assessment & Plan:     # Acne vulgaris, uncontrolled    -continue Clindamycin 1% lotion to use 1-2 times daily   -Recommend benzoyl peroxide 10% wash to all areas of acne. Patient counseled medication can bleach towels and clothing.  -Start tretinoin 0.1% cream. Apply once every other day and increase to nightly as tolerate.  Waiting a few minutes after washing affected areas will decrease irritation. Method of application, side effects and expected results were discussed. The patient will apply pea size amount to the entire face, avoid areas around the eyes, corners of nose and mouth. Discussed side effects including photosensitivity and irritation.   -moisturize with a cream such as Cetaphil cream, Cera Ve Cream or Vanicream nightly and more PRN for dryness.    -Increase dosage to spironolactone 150 mg daily, if tolerating this well okay to increase to 200 mg daily if she is not seeing results.  Patient will let me know on MyChart if she increases to 200 mg and I will send an updated prescription.  Side effects again reviewed with patient.  Patient is preventing pregnancy.  She has had no side effects.      Follow-up:  follow-up in 1 year follow-up sooner as needed if she is still struggling and not well-controlled., prn for new or changing lesions or new concerns    Lainey Guallpa,  CNP  Dermatology   ____________________________________________    CC: Acne (Patient seeing very minimal improvement, would like to reassess medication. New acne on back in past month,. Taking spironolactone 50 mg, clindamycin in the AM, and tretinoin in the PM. )    HPI:  Ms. Ledy Oconnor is a(n) 43 year old female who presents today as a return patient for follow up of acne. She tried tretinoin 0.025% cream and spironolactone 100 mg daily and had no side effects but did not see results.  At her last visit 1/12/2024 we started her on clindamycin lotion, tretinoin 0.05% cream, and restarted spironolactone to 100 mg daily..   She still has many breakouts and is seeing very minimal improvement.  She does not feel well-controlled and wants to change of her regimen. Denies lightheadedness, dizziness, heart palpitations, headaches, breast tenderness or spotting between menstrual cycles. She is preventing pregnancy.       Patient is otherwise feeling well, without additional skin concerns.     Physical Exam:  Vitals: There were no vitals taken for this visit.  SKIN: face and neck  -open and closed comedones and cystic inflammatory lesions on lower chin, jawline and neck    - No other lesions of concern on areas examined.     Medications:  Current Outpatient Medications   Medication Sig Dispense Refill     clindamycin (CLEOCIN T) 1 % external lotion Apply topically every morning Apply to face and neck 1-2 times daily 60 mL 11     multivitamin w/minerals (THERA-VIT-M) tablet Take 1 tablet by mouth daily       spironolactone (ALDACTONE) 50 MG tablet Take 3 tablets (150 mg) by mouth daily 270 tablet 3     tretinoin (RETIN-A) 0.1 % external cream Apply topically at bedtime 45 g 11     albuterol (PROVENTIL) (2.5 MG/3ML) 0.083% neb solution Take 1 vial (2.5 mg) by nebulization every 4 hours as needed for shortness of breath, wheezing or cough (Patient not taking: Reported on 5/16/2024) 90 mL 0     benzonatate (TESSALON) 100 MG  capsule Take 1-2 capsules by mouth up to 3 times daily as needed for cough. (Patient not taking: Reported on 5/16/2024) 30 capsule 0     levonorgestrel (MIRENA) 20 MCG/24HR IUD 1 each by Intrauterine route once       Omega-3 Fatty Acids (FISH OIL) 1200 MG capsule Take 1,200 mg by mouth daily (Patient not taking: Reported on 8/19/2023)       No current facility-administered medications for this visit.     Facility-Administered Medications Ordered in Other Visits   Medication Dose Route Frequency Provider Last Rate Last Admin     sodium chloride (PF) 0.9% PF flush 3 mL  3 mL Intracatheter Q8H Arturo Barclay MD   20 mL at 07/28/15 1712      Past Medical History:   Patient Active Problem List   Diagnosis     Cluster headache, chronic     Fibroid uterus     PCOS (polycystic ovarian syndrome)     IUD (intrauterine device) in place     Past Medical History:   Diagnosis Date     Headache      History of PCOS        CC Soheila Louie PA-C  [facility]  [address] [place] [address] on close of this encounter.      Again, thank you for allowing me to participate in the care of your patient.        Sincerely,        Yadira Guallpa, SHAVONNE CNP

## 2024-05-16 NOTE — PROGRESS NOTES
[facility] Dermatology Note  Encounter Date: May 16, 2024  Office Visit     Reviewed patients past medical history and pertinent chart review prior to patients visit today.     Dermatology Problem List:  9 nevus on chin biopsy 06/23/22  Acne, restarted spironolactone 100 mg daily and tretinoin 0.05% cream and clindamycin lotion  ____________________________________________    Assessment & Plan:     # Acne vulgaris, uncontrolled    -continue Clindamycin 1% lotion to use 1-2 times daily   -Recommend benzoyl peroxide 10% wash to all areas of acne. Patient counseled medication can bleach towels and clothing.  -Start tretinoin 0.1% cream. Apply once every other day and increase to nightly as tolerate.  Waiting a few minutes after washing affected areas will decrease irritation. Method of application, side effects and expected results were discussed. The patient will apply pea size amount to the entire face, avoid areas around the eyes, corners of nose and mouth. Discussed side effects including photosensitivity and irritation.   -moisturize with a cream such as Cetaphil cream, Cera Ve Cream or Vanicream nightly and more PRN for dryness.    -Increase dosage to spironolactone 150 mg daily, if tolerating this well okay to increase to 200 mg daily if she is not seeing results.  Patient will let me know on MyChart if she increases to 200 mg and I will send an updated prescription.  Side effects again reviewed with patient.  Patient is preventing pregnancy.  She has had no side effects.      Follow-up:  follow-up in 1 year follow-up sooner as needed if she is still struggling and not well-controlled., prn for new or changing lesions or new concerns    Lainey Guallpa, JINA  Dermatology   ____________________________________________    CC: Acne (Patient seeing very minimal improvement, would like to reassess medication. New acne on back in past month,. Taking spironolactone 50 mg, clindamycin in the AM, and  tretinoin in the PM. )    HPI:  Ms. Ledy Oconnor is a(n) 43 year old female who presents today as a return patient for follow up of acne. She tried tretinoin 0.025% cream and spironolactone 100 mg daily and had no side effects but did not see results.  At her last visit 1/12/2024 we started her on clindamycin lotion, tretinoin 0.05% cream, and restarted spironolactone to 100 mg daily..   She still has many breakouts and is seeing very minimal improvement.  She does not feel well-controlled and wants to change of her regimen. Denies lightheadedness, dizziness, heart palpitations, headaches, breast tenderness or spotting between menstrual cycles. She is preventing pregnancy.       Patient is otherwise feeling well, without additional skin concerns.     Physical Exam:  Vitals: There were no vitals taken for this visit.  SKIN: face and neck  -open and closed comedones and cystic inflammatory lesions on lower chin, jawline and neck    - No other lesions of concern on areas examined.     Medications:  Current Outpatient Medications   Medication Sig Dispense Refill    clindamycin (CLEOCIN T) 1 % external lotion Apply topically every morning Apply to face and neck 1-2 times daily 60 mL 11    multivitamin w/minerals (THERA-VIT-M) tablet Take 1 tablet by mouth daily      spironolactone (ALDACTONE) 50 MG tablet Take 3 tablets (150 mg) by mouth daily 270 tablet 3    tretinoin (RETIN-A) 0.1 % external cream Apply topically at bedtime 45 g 11    albuterol (PROVENTIL) (2.5 MG/3ML) 0.083% neb solution Take 1 vial (2.5 mg) by nebulization every 4 hours as needed for shortness of breath, wheezing or cough (Patient not taking: Reported on 5/16/2024) 90 mL 0    benzonatate (TESSALON) 100 MG capsule Take 1-2 capsules by mouth up to 3 times daily as needed for cough. (Patient not taking: Reported on 5/16/2024) 30 capsule 0    levonorgestrel (MIRENA) 20 MCG/24HR IUD 1 each by Intrauterine route once      Omega-3 Fatty Acids (FISH OIL)  1200 MG capsule Take 1,200 mg by mouth daily (Patient not taking: Reported on 8/19/2023)       No current facility-administered medications for this visit.     Facility-Administered Medications Ordered in Other Visits   Medication Dose Route Frequency Provider Last Rate Last Admin    sodium chloride (PF) 0.9% PF flush 3 mL  3 mL Intracatheter Q8H Arturo Barclay MD   20 mL at 07/28/15 1712      Past Medical History:   Patient Active Problem List   Diagnosis    Cluster headache, chronic    Fibroid uterus    PCOS (polycystic ovarian syndrome)    IUD (intrauterine device) in place     Past Medical History:   Diagnosis Date    Headache     History of PCOS        CC Soheila Louie PA-C  [facility]  [address] [place]  [place] [address] on close of this encounter.

## 2024-06-23 ENCOUNTER — HEALTH MAINTENANCE LETTER (OUTPATIENT)
Age: 44
End: 2024-06-23

## 2025-07-12 ENCOUNTER — HEALTH MAINTENANCE LETTER (OUTPATIENT)
Age: 45
End: 2025-07-12

## 2025-08-05 ENCOUNTER — MYC REFILL (OUTPATIENT)
Dept: DERMATOLOGY | Facility: CLINIC | Age: 45
End: 2025-08-05
Payer: COMMERCIAL

## 2025-08-05 DIAGNOSIS — L70.0 ACNE VULGARIS: ICD-10-CM

## 2025-08-14 RX ORDER — SPIRONOLACTONE 50 MG/1
150 TABLET, FILM COATED ORAL DAILY
Qty: 270 TABLET | Refills: 3 | OUTPATIENT
Start: 2025-08-14